# Patient Record
Sex: FEMALE | Race: OTHER | Employment: UNEMPLOYED | URBAN - METROPOLITAN AREA
[De-identification: names, ages, dates, MRNs, and addresses within clinical notes are randomized per-mention and may not be internally consistent; named-entity substitution may affect disease eponyms.]

---

## 2017-03-09 ENCOUNTER — OFFICE VISIT (OUTPATIENT)
Dept: OBGYN CLINIC | Age: 42
End: 2017-03-09

## 2017-03-09 ENCOUNTER — APPOINTMENT (OUTPATIENT)
Dept: MAMMOGRAPHY | Age: 42
End: 2017-03-09

## 2017-03-09 VITALS
WEIGHT: 127 LBS | SYSTOLIC BLOOD PRESSURE: 119 MMHG | HEIGHT: 62 IN | BODY MASS INDEX: 23.37 KG/M2 | DIASTOLIC BLOOD PRESSURE: 75 MMHG | HEART RATE: 86 BPM

## 2017-03-09 DIAGNOSIS — Z01.419 ENCOUNTER FOR GYNECOLOGICAL EXAMINATION: Primary | ICD-10-CM

## 2017-03-09 DIAGNOSIS — L68.0 HIRSUTISM: ICD-10-CM

## 2017-03-09 DIAGNOSIS — Z12.31 ENCOUNTER FOR SCREENING MAMMOGRAM FOR MALIGNANT NEOPLASM OF BREAST: ICD-10-CM

## 2017-03-09 DIAGNOSIS — R87.810 ASCUS WITH POSITIVE HIGH RISK HPV CERVICAL: ICD-10-CM

## 2017-03-09 DIAGNOSIS — R87.610 ASCUS WITH POSITIVE HIGH RISK HPV CERVICAL: ICD-10-CM

## 2017-03-09 RX ORDER — DESOGESTREL AND ETHINYL ESTRADIOL 21-5 (28)
1 KIT ORAL DAILY
Qty: 1 PACKAGE | Refills: 3 | Status: SHIPPED | OUTPATIENT
Start: 2017-03-09 | End: 2019-05-13

## 2017-03-09 NOTE — MR AVS SNAPSHOT
Visit Information Date & Time Provider Department Dept. Phone Encounter #  
 3/9/2017 10:40 AM Kellen Armendariz [phone] [id number] Upcoming Health Maintenance Date Due INFLUENZA AGE 9 TO ADULT 8/1/2016 PAP AKA CERVICAL CYTOLOGY 5/17/2019 Allergies as of 3/9/2017  Review Complete On: 3/9/2017 By: Gerald Simpson Severity Noted Reaction Type Reactions Sulfa (Sulfonamide Antibiotics)  06/30/2014    Hives Current Immunizations  Never Reviewed No immunizations on file. Not reviewed this visit Vitals BP Pulse Height(growth percentile) Weight(growth percentile) LMP BMI  
 119/75 86 5' 1.5\" (1.562 m) 127 lb (57.6 kg) 03/01/2017 (Exact Date) 23.61 kg/m2 OB Status Smoking Status Having regular periods Never Smoker BMI and BSA Data Body Mass Index Body Surface Area  
 23.61 kg/m 2 1.58 m 2 Your Updated Medication List  
  
   
This list is accurate as of: 3/9/17 10:56 AM.  Always use your most recent med list.  
  
  
  
  
 acyclovir 200 mg capsule Commonly known as:  ZOVIRAX  
  
 ergocalciferol 50,000 unit capsule Commonly known as:  ERGOCALCIFEROL Patient Instructions Well Visit, Ages 25 to 48: Care Instructions Your Care Instructions Physical exams can help you stay healthy. Your doctor has checked your overall health and may have suggested ways to take good care of yourself. He or she also may have recommended tests. At home, you can help prevent illness with healthy eating, regular exercise, and other steps. Follow-up care is a key part of your treatment and safety. Be sure to make and go to all appointments, and call your doctor if you are having problems. It's also a good idea to know your test results and keep a list of the medicines you take. How can you care for yourself at home? · Reach and stay at a healthy weight. This will lower your risk for many problems, such as obesity, diabetes, heart disease, and high blood pressure. · Get at least 30 minutes of physical activity on most days of the week. Walking is a good choice. You also may want to do other activities, such as running, swimming, cycling, or playing tennis or team sports. Discuss any changes in your exercise program with your doctor. · Do not smoke or allow others to smoke around you. If you need help quitting, talk to your doctor about stop-smoking programs and medicines. These can increase your chances of quitting for good. · Talk to your doctor about whether you have any risk factors for sexually transmitted infections (STIs). Having one sex partner (who does not have STIs and does not have sex with anyone else) is a good way to avoid these infections. · Use birth control if you do not want to have children at this time. Talk with your doctor about the choices available and what might be best for you. · Protect your skin from too much sun. When you're outdoors from 10 a.m. to 4 p.m., stay in the shade or cover up with clothing and a hat with a wide brim. Wear sunglasses that block UV rays. Even when it's cloudy, put broad-spectrum sunscreen (SPF 30 or higher) on any exposed skin. · See a dentist one or two times a year for checkups and to have your teeth cleaned. · Wear a seat belt in the car. · Drink alcohol in moderation, if at all. That means no more than 2 drinks a day for men and 1 drink a day for women. Follow your doctor's advice about when to have certain tests. These tests can spot problems early. For everyone · Cholesterol. Have the fat (cholesterol) in your blood tested after age 21. Your doctor will tell you how often to have this done based on your age, family history, or other things that can increase your risk for heart disease. · Blood pressure. Have your blood pressure checked during a routine doctor visit. Your doctor will tell you how often to check your blood pressure based on your age, your blood pressure results, and other factors. · Vision. Talk with your doctor about how often to have a glaucoma test. 
· Diabetes. Ask your doctor whether you should have tests for diabetes. · Colon cancer. Have a test for colon cancer at age 48. You may have one of several tests. If you are younger than 48, you may need a test earlier if you have any risk factors. Risk factors include whether you already had a precancerous polyp removed from your colon or whether your parent, brother, sister, or child has had colon cancer. For women · Breast exam and mammogram. Talk to your doctor about when you should have a clinical breast exam and a mammogram. Medical experts differ on whether and how often women under 50 should have these tests. Your doctor can help you decide what is right for you. · Pap test and pelvic exam. Begin Pap tests at age 24. A Pap test is the best way to find cervical cancer. The test often is part of a pelvic exam. Ask how often to have this test. 
· Tests for sexually transmitted infections (STIs). Ask whether you should have tests for STIs. You may be at risk if you have sex with more than one person, especially if your partners do not wear condoms. For men · Tests for sexually transmitted infections (STIs). Ask whether you should have tests for STIs. You may be at risk if you have sex with more than one person, especially if you do not wear a condom. · Testicular cancer exam. Ask your doctor whether you should check your testicles regularly. · Prostate exam. Talk to your doctor about whether you should have a blood test (called a PSA test) for prostate cancer.  Experts differ on whether and when men should have this test. Some experts suggest it if you are older than 39 and are -American or have a father or brother who got prostate cancer when he was younger than 72. When should you call for help? Watch closely for changes in your health, and be sure to contact your doctor if you have any problems or symptoms that concern you. Where can you learn more? Go to http://xiomy-liu.info/. Enter P072 in the search box to learn more about \"Well Visit, Ages 25 to 48: Care Instructions. \" Current as of: July 19, 2016 Content Version: 11.1 © 0067-6597 [employer]orporated. Care instructions adapted under license by [employer] (which disclaims liability or warranty for this information). If you have questions about a medical condition or this instruction, always ask your healthcare professional. [address] any warranty or liability for your use of this information. Introducing [facility]! [employer] introduces PowerVision patient portal. Now you can access parts of your medical record, email your doctor's office, and request medication refills online. 1. In your internet browser, go to [email or web address] 2. Click on the First Time User? Click Here link in the Sign In box. You will see the New Member Sign Up page. 3. Enter your PowerVision Access Code exactly as it appears below. You will not need to use this code after youve completed the sign-up process. If you do not sign up before the expiration date, you must request a new code. · PowerVision Access Code: [email or web address] Expires: 6/7/2017 10:56 AM 
 
4. Enter the last four digits of your Social Security Number (xxxx) and Date of Birth (mm/dd/yyyy) as indicated and click Submit. You will be taken to the next sign-up page. 5. Create a PowerVision ID. This will be your PowerVision login ID and cannot be changed, so think of one that is secure and easy to remember. 6. Create a Agora Mobile password. You can change your password at any time. 7. Enter your Password Reset Question and Answer. This can be used at a later time if you forget your password. 8. Enter your e-mail address. You will receive e-mail notification when new information is available in [address] 9. Click Sign Up. You can now view and download portions of your medical record. 10. Click the Download Summary menu link to download a portable copy of your medical information. If you have questions, please visit the Frequently Asked Questions section of the Agora Mobile website. Remember, Agora Mobile is NOT to be used for urgent needs. For medical emergencies, dial 911. Now available from your iPhone and Android! Please provide this summary of care documentation to your next provider. If you have any questions after today's visit, please call [phone].

## 2017-03-09 NOTE — PATIENT INSTRUCTIONS

## 2017-03-09 NOTE — PROGRESS NOTES
Annual exam ages 40-58    [address] Rich is a ,  37 y.o. female OTHER Patient's last menstrual period was 2017 (exact date). , who presents for her annual checkup. Since her last annual GYN exam about two years ago on 9/14/15,  she has the following changes in her health history: none. ADDITIONAL CONCERNS:  She is having cycles every 21 days for the last 3 months. Previously were about every 4wks. Premenstrual HA and mood changes. HA will subside the first couple of days of her cycle, but then may come back toward the end of her cycle. Will take ibpfn - helps a little, but doesn't get rid of. Hirsutism. Labs drawn at [address] 2015, wnl. Hair growth on chin/neck and nipples is a little worse. With regard to the Gardasil vaccine, she is older than the age for which it is FDA approved. Menstrual status:    Her periods are heavier in flow. She is using one to two pads or tampons per day, lasting for 4-5 days  without spotting. Does pass some clots. She has some dysmenorrhea. She denies premenstrual symptoms. Contraception:    The current method of family planning is none and abstinence. Sexual history:     reports that she does not currently engage in sexual activity but has had male partners.; She reports using the following methods of birth control/protection: None and Abstinence. Pap and Mammogram History:    H/o colpo in college, not sure of results. Thinks may have been tx'd with cryo.     Pap/HPV (2014) N/N  Pap (9/14/15) ASCUS ; HPV Positive [done at pt's request]  -> rec colpo, pt declined, requested rpt pap  Pap/HPV (16) N/N    The patient had her mammogram today in our office. Osteoporosis History:    Family history does not include a first or second degree relative with osteopenia or osteoporosis. Past Medical History:   Diagnosis Date    Abnormal Pap smear of cervix 2015    ASCUS ; HPV Positive -> rec colpo, pt declined.   H/o Colpo in college. Not sure of details, thinks she was tx'd with cryo    Screening for malignant neoplasm of the cervix 14    Negative ; HPV Negative     Past Surgical History:   Procedure Laterality Date    HX  SECTION  2013    HX COLPOSCOPY  ~    not sure of details. ?cryo done? OB History    Para Term  AB SAB TAB Ectopic Multiple Living   1 1 1       1      # Outcome Date GA Lbr Gagandeep/2nd Weight Sex Delivery Anes PTL Lv   1 Term 02/10/13 40w0d 23:00 5 lb 8 oz (2.495 kg) M  CL Spinal N Y      Birth Comments: Cord wrapped around baby's nexk - emerg. c/s          Current Outpatient Prescriptions   Medication Sig Dispense Refill    acyclovir (ZOVIRAX) 200 mg capsule   4    ergocalciferol (ERGOCALCIFEROL) 50,000 unit capsule   10     Allergies: Sulfa (sulfonamide antibiotics)   Social History     Social History    Marital status: SINGLE     Spouse name: N/A    Number of children: N/A    Years of education: N/A     Occupational History    Not on file. Social History Main Topics    Smoking status: Never Smoker    Smokeless tobacco: Never Used      Comment: Does not use vapor and e-cigs    Alcohol use No    Drug use: No    Sexual activity: Not Currently     Partners: Male     Birth control/ protection: None, Abstinence     Other Topics Concern    Not on file     Social History Narrative     Tobacco History:  reports that she has never smoked. She has never used smokeless tobacco.  Alcohol Abuse:  reports that she does not drink alcohol. Drug Abuse:  reports that she does not use illicit drugs.     Patient Active Problem List   Diagnosis Code    Abnormal Pap smear of cervix R87.619     Family History   Problem Relation Age of Onset    Breast Cancer Neg Hx        Review of Systems - History obtained from the patient  Constitutional: negative for weight loss, fever, night sweats  HEENT: negative for hearing loss, earache, congestion, snoring, sorethroat  CV: negative for chest pain, palpitations, edema  Resp: negative for cough, shortness of breath, wheezing  GI: negative for change in bowel habits, abdominal pain, black or bloody stools  : negative for frequency, dysuria, hematuria, vaginal discharge  MSK: negative for back pain, joint pain, muscle pain  Breast: negative for breast lumps, nipple discharge, galactorrhea  Skin :negative for itching, rash, hives  Neuro: negative for dizziness, confusion, weakness; +HA  Psych: negative for anxiety, depression, change in mood  Heme/lymph: negative for bleeding, bruising, pallor    Physical Exam    Visit Vitals    /75    Pulse 86    Ht 5' 1.5\" (1.562 m)    Wt 127 lb (57.6 kg)    LMP 03/01/2017 (Exact Date)    BMI 23.61 kg/m2       Constitutional  · Appearance: well-nourished, well developed, alert, in no acute distress    HENT  · Head and Face: appears normal    Neck  · Inspection/Palpation: normal appearance, no masses or tenderness  · Lymph Nodes: no lymphadenopathy present  · Thyroid: gland size normal, nontender, no nodules or masses present on palpation    Chest  · Respiratory Effort: breathing unlabored  · Auscultation: normal breath sounds    Cardiovascular  · Heart:  · Auscultation: regular rate and rhythm without murmur    Breasts  · Inspection of Breasts: breasts symmetrical, no skin changes, no discharge present, nipple appearance normal, no skin retraction present  · Palpation of Breasts and Axillae: no masses present on palpation, no breast tenderness  · Axillary Lymph Nodes: no lymphadenopathy present    Gastrointestinal  · Abdominal Examination: abdomen non-tender to palpation, normal bowel sounds, no masses present  · Liver and spleen: no hepatomegaly present, spleen not palpable  · Hernias: no hernias identified    Genitourinary  · External Genitalia: normal appearance for age, no discharge present, no tenderness present, no inflammatory lesions present, no masses present, no atrophy present  · Vagina: normal vaginal vault without central or paravaginal defects, no discharge present, no inflammatory lesions present, no masses present  · Bladder: non-tender to palpation  · Urethra: appears normal  · Cervix: normal   · Uterus: normal size, shape and consistency  · Adnexa: no adnexal tenderness present, no adnexal masses present  · Perineum: perineum within normal limits, no evidence of trauma, no rashes or skin lesions present  · Anus: anus within normal limits, no hemorrhoids present  · Inguinal Lymph Nodes: no lymphadenopathy present    Skin  · General Inspection: no rash, no lesions identified    Neurologic/Psychiatric  · Mental Status:  · Orientation: grossly oriented to person, place and time  · Mood and Affect: mood normal, affect appropriate    Results for orders placed or performed in visit on 03/09/17   MARVA MAMMO BI SCREENING INCL CAD    Narrative    STUDY: Bilateral digital screening mammogram    INDICATION:  Screening. COMPARISON:  None, baseline    BREAST COMPOSITION:  The breasts are heterogeneously dense, which may obscure  small masses. FINDINGS: Bilateral digital screening mammography was performed and is  interpreted in conjunction with a computer assisted detection (CAD) system. No  suspicious masses or calcifications are identified. Impression    IMPRESSION:  BI-RADS 2: Benign. No mammographic evidence of malignancy. RECOMMENDATIONS:  Next screening mammogram is recommended in one year. The patient will be notified of these results. Assessment & Plan:  · Routine gynecologic examination. · Reviewed previous paps, guidelines. Will do pap with reflex HPV today. If nl, then plan pap/HPV with AE in 1yr. · Hirsutism. Will draw, TT, FT, SHBG, DHEA-S, 17-OHP. · Menstrual HA. Will try Mircette. Risk and benefits reviewed, including thromboembolic events. 1st Sunday start, backup method until second pack. Handout given.   RTO 3 months for BP check, side effect eval. Should also help with hirsutism. · Cycles closer together, heavier, crampier. Should improve with OCPs as above. Menstrual calendar given. · Counseled re: diet, exercise, healthy lifestyle  · Return for yearly wellness visits  · Rec annual mammogram.  Done today in our office, wnl. · Patient verbalized understanding    Orders Placed This Encounter    DHEA SULFATE    SEX HORMONE BINDING GLOBULIN    TESTOSTERONE, FREE & TOTAL    17-OH PROGESTERONE LCMS    desog-e.estradiol/e.estradiol (MIRCETTE, 28,) 0.15-0.02 mgx21 /0.01 mg x 5 tab     Sig: Take 1 Tab by mouth daily. Dispense:  1 Package     Refill:  3                No orders of the defined types were placed in this encounter.

## 2017-03-12 LAB
17OHP SERPL-MCNC: 25 NG/DL
CYTOLOGIST CVX/VAG CYTO: NORMAL
CYTOLOGY CVX/VAG DOC THIN PREP: NORMAL
DHEA-S SERPL-MCNC: 177.7 UG/DL (ref 57.3–279.2)
DX ICD CODE: NORMAL
LABCORP, 190119: NORMAL
Lab: NORMAL
OTHER STN SPEC: NORMAL
PATH REPORT.FINAL DX SPEC: NORMAL
SHBG SERPL-SCNC: 58.6 NMOL/L (ref 24.6–122)
STAT OF ADQ CVX/VAG CYTO-IMP: NORMAL
TESTOST FREE SERPL-MCNC: 1.4 PG/ML (ref 0–4.2)
TESTOST SERPL-MCNC: 6 NG/DL (ref 8–48)

## 2017-03-13 ENCOUNTER — TELEPHONE (OUTPATIENT)
Dept: OBGYN CLINIC | Age: 42
End: 2017-03-13

## 2017-03-13 NOTE — TELEPHONE ENCOUNTER
Patient notified and verbalized understanding. Patient is wondering if low testosterone may cause headaches? Is there any medications she needs to take? What other side affects may low Testosterone  have? Is this a big concern?

## 2017-03-13 NOTE — PROGRESS NOTES
Patient notified and verbalized understanding. Patient is wondering if low testosterone may cause headaches? Is there any medications she needs to take? What other side affects may low Testosterone  have? Is this a big concern?      See Tel enc

## 2017-03-13 NOTE — TELEPHONE ENCOUNTER
----- Message from Jamaica Bello MD sent at 3/13/2017  7:54 AM EDT -----  Blood work is normal.  Testosterone level is actually slightly low, unchanged from when it has been checked in the past.

## 2017-03-14 NOTE — TELEPHONE ENCOUNTER
Low testosterone can be related to decreased libido, but should not cause HA. No other concerns. Her levels have been stable in this range, just barely low (lower limit of normal is 8).   Her circulating level of active testosterone is normal.

## 2017-03-30 ENCOUNTER — TELEPHONE (OUTPATIENT)
Dept: OBGYN CLINIC | Age: 42
End: 2017-03-30

## 2017-03-30 ENCOUNTER — OFFICE VISIT (OUTPATIENT)
Dept: OBGYN CLINIC | Age: 42
End: 2017-03-30

## 2017-03-30 VITALS
HEIGHT: 62 IN | DIASTOLIC BLOOD PRESSURE: 59 MMHG | WEIGHT: 124 LBS | SYSTOLIC BLOOD PRESSURE: 90 MMHG | HEART RATE: 87 BPM | BODY MASS INDEX: 22.82 KG/M2

## 2017-03-30 DIAGNOSIS — R10.2 VULVAR PAIN: ICD-10-CM

## 2017-03-30 DIAGNOSIS — R10.2 VAGINAL PAIN: ICD-10-CM

## 2017-03-30 DIAGNOSIS — Z11.3 SCREENING FOR VENEREAL DISEASE: ICD-10-CM

## 2017-03-30 DIAGNOSIS — N94.89 VULVAR BURNING: Primary | ICD-10-CM

## 2017-03-30 LAB — WET MOUNT POCT, WMPOCT: NEGATIVE

## 2017-03-30 RX ORDER — FLUCONAZOLE 150 MG/1
150 TABLET ORAL DAILY
Qty: 1 TAB | Refills: 0 | Status: SHIPPED | OUTPATIENT
Start: 2017-03-30 | End: 2017-03-31

## 2017-03-30 RX ORDER — CLOTRIMAZOLE AND BETAMETHASONE DIPROPIONATE 10; .64 MG/G; MG/G
CREAM TOPICAL
Qty: 45 G | Refills: 0 | Status: SHIPPED | OUTPATIENT
Start: 2017-03-30 | End: 2019-06-10

## 2017-03-30 NOTE — MR AVS SNAPSHOT
Visit Information Date & Time Provider Department Dept. Phone Encounter #  
 3/30/2017  1:00  S Anthony Brown, [address] [phone] [id number] Your Appointments 6/27/2017 10:00 AM  
FOLLOW UP 10 with [address], MD  
[place] ([facility]) Appt Note: 3 month gyn f/u DM - OCP start [address] [place] [address] Road [address] [place] [address]  
[phone]  
  
   
 [address] [place] [address]  
  
    
 3/12/2018 10:20 AM  
MAMMOGRAPHY with MAMMOGRAM, [place] ([facility]) Appt Note: mammo and ae DM  
 [address] [place], [place] [address]d Road [address] [place] [address]  
[phone]  
  
   
 [address] [place] [address] Road [address]  
  
    
 3/12/2018 10:40 AM  
ESTABLISHED PATIENT with [address], MD  
[place] ([facility]) Appt Note: mammo and ae DM  
 [address] [place] [address] Road [address] [facility] 99 [id number]  
[facility] [address] Upcoming Health Maintenance Date Due INFLUENZA AGE 9 TO ADULT 8/1/2016 BREAST CANCER SCRN MAMMOGRAM 3/9/2018 PAP AKA CERVICAL CYTOLOGY 3/9/2020 Allergies as of 3/30/2017  Review Complete On: 3/30/2017 By: Reyna Willams Severity Noted Reaction Type Reactions Sulfa (Sulfonamide Antibiotics)  06/30/2014    Hives Current Immunizations  Never Reviewed No immunizations on file. Not reviewed this visit Vitals BP Pulse Height(growth percentile) Weight(growth percentile) LMP BMI  
 90/59 87 5' 1.5\" (1.562 m) 124 lb (56.2 kg) 03/30/2017 23.05 kg/m2 OB Status Smoking Status Having regular periods Never Smoker BMI and BSA Data Body Mass Index Body Surface Area 23.05 kg/m 2 1.56 m 2 Preferred Pharmacy Pharmacy Name Phone [id number], [address] AT [facility] [phone] Your Updated Medication List  
  
   
This list is accurate as of: 3/30/17  1:08 PM.  Always use your most recent med list.  
  
  
  
  
 acyclovir 200 mg capsule Commonly known as:  ZOVIRAX  
  
 desog-e.estradiol/e.estradiol 0.15-0.02 mgx21 /0.01 mg x 5 Tab Commonly known as:  MIRCETTE (28) Take 1 Tab by mouth daily. ergocalciferol 50,000 unit capsule Commonly known as:  ERGOCALCIFEROL Patient Instructions Per Dr. Carolyn Mcguire: 
Lulú Smith MD  
  
  3:03 PM  
Note Low testosterone can be related to decreased libido, but should not cause HA. No other concerns. Her levels have been stable in this range, just barely low (lower limit of normal is 8). Her circulating level of active testosterone is normal.  
  
 
 
  
Vulvar Pain: Care Instructions Your Care Instructions The vulva is the area around the opening of the vagina. The cause of vulvar pain (vulvodynia) is not always clear, but it may include inflamed nerves, allergies, skin diseases, diabetes, or infection. You may have pain just in the vulva, or it may reach to the rectal area or legs. Vulvar pain can flare up with activities such as sitting on a bicycle, having sex, or inserting a tampon. Follow-up care is a key part of your treatment and safety. Be sure to make and go to all appointments, and call your doctor if you are having problems. Its also a good idea to know your test results and keep a list of the medicines you take. How can you care for yourself at home? · Take your medicines exactly as prescribed. Call your doctor if you think you are having a problem with your medicine. · Relieve itching with a cold water compress or cool baths. Do not scratch the area. · Wear loose-fitting, cotton clothes. Avoid nylon and other fabric that holds moisture close to the skin. This may allow an infection to start. · Do not douche, unless your doctor tells you to. · Limit exercise that can irritate the vulva, such as bike riding or horseback riding. · Avoid hot baths, and do not use soaps or bath products to wash your vulvar area. Rinse with water only, and gently pat the area dry. When should you call for help? Call your doctor now or seek immediate medical care if: 
· You have signs of infection, such as: 
¨ Increased pain, swelling, warmth, or redness. ¨ Red streaks leading from the vulvar area. ¨ Pus draining from the area. ¨ A fever. Watch closely for changes in your health, and be sure to contact your doctor if: 
· You have unusual vaginal discharge. · You do not get better as expected. Where can you learn more? Go to http://xiomy-liu.info/. Enter 0490 39 07 81 in the search box to learn more about \"Vulvar Pain: Care Instructions. \" Current as of: October 13, 2016 Content Version: 11.2 © 2462-4993 "[employer]. Care instructions adapted under license by [employer] (which disclaims liability or warranty for this information). If you have questions about a medical condition or this instruction, always ask your healthcare professional. [place] [address] any warranty or liability for your use of this information. Introducing [facility]! Apurva Orozco introduces Cloudian patient portal. Now you can access parts of your medical record, email your doctor's office, and request medication refills online. 1. In your internet browser, go to [email or web address] 2. Click on the First Time User? Click Here link in the Sign In box. You will see the New Member Sign Up page. 3. Enter your Cloudian Access Code exactly as it appears below. You will not need to use this code after youve completed the sign-up process. If you do not sign up before the expiration date, you must request a new code. · Cloudian Access Code: [email or web address] Expires: 6/7/2017 11:56 AM 
 
4. Enter the last four digits of your Social Security Number (xxxx) and Date of Birth (mm/dd/yyyy) as indicated and click Submit. You will be taken to the next sign-up page. 5. Create a RevTrax ID. This will be your RevTrax login ID and cannot be changed, so think of one that is secure and easy to remember. 6. Create a RevTrax password. You can change your password at any time. 7. Enter your Password Reset Question and Answer. This can be used at a later time if you forget your password. 8. Enter your e-mail address. You will receive e-mail notification when new information is available in [address] 9. Click Sign Up. You can now view and download portions of your medical record. 10. Click the Download Summary menu link to download a portable copy of your medical information. If you have questions, please visit the Frequently Asked Questions section of the RevTrax website. Remember, RevTrax is NOT to be used for urgent needs. For medical emergencies, dial 911. Now available from your iPhone and Android! Please provide this summary of care documentation to your next provider. If you have any questions after today's visit, please call [phone].

## 2017-03-30 NOTE — TELEPHONE ENCOUNTER
Call received at 8:34am      37year old patient last seen in the office on 3/9/17. Patient calling to complain of pain in vaginal area, burning upon urination that can be 9 on scale of 1-10. Patient denies discharge. Patient reports the symptoms started the beginning of the week. Patient placed on the schedule for 1 pm today as per Dr. Elvi Glass. Patient advised that she was being worked in. Patient verbalized understanding.

## 2017-03-30 NOTE — PATIENT INSTRUCTIONS
Per Dr. Mckeon Day:  Erica Dakins, MD        3:03 PM   Note      Low testosterone can be related to decreased libido, but should not cause HA. No other concerns. Her levels have been stable in this range, just barely low (lower limit of normal is 8). Her circulating level of active testosterone is normal.             Vulvar Pain: Care Instructions  Your Care Instructions  The vulva is the area around the opening of the vagina. The cause of vulvar pain (vulvodynia) is not always clear, but it may include inflamed nerves, allergies, skin diseases, diabetes, or infection. You may have pain just in the vulva, or it may reach to the rectal area or legs. Vulvar pain can flare up with activities such as sitting on a bicycle, having sex, or inserting a tampon. Follow-up care is a key part of your treatment and safety. Be sure to make and go to all appointments, and call your doctor if you are having problems. Its also a good idea to know your test results and keep a list of the medicines you take. How can you care for yourself at home? · Take your medicines exactly as prescribed. Call your doctor if you think you are having a problem with your medicine. · Relieve itching with a cold water compress or cool baths. Do not scratch the area. · Wear loose-fitting, cotton clothes. Avoid nylon and other fabric that holds moisture close to the skin. This may allow an infection to start. · Do not douche, unless your doctor tells you to. · Limit exercise that can irritate the vulva, such as bike riding or horseback riding. · Avoid hot baths, and do not use soaps or bath products to wash your vulvar area. Rinse with water only, and gently pat the area dry. When should you call for help? Call your doctor now or seek immediate medical care if:  · You have signs of infection, such as:  ¨ Increased pain, swelling, warmth, or redness. ¨ Red streaks leading from the vulvar area. ¨ Pus draining from the area. ¨ A fever.   Watch closely for changes in your health, and be sure to contact your doctor if:  · You have unusual vaginal discharge. · You do not get better as expected. Where can you learn more? Go to http://xiomy-liu.info/. Enter 0490 39 07 81 in the search box to learn more about \"Vulvar Pain: Care Instructions. \"  Current as of: October 13, 2016  Content Version: 11.2  © 4893-8316 [employer]. Care instructions adapted under license by [employer] (which disclaims liability or warranty for this information). If you have questions about a medical condition or this instruction, always ask your healthcare professional. [address] any warranty or liability for your use of this information.

## 2017-03-30 NOTE — PROGRESS NOTES
Vaginitis evaluation    Chief Complaint   Vaginitis and Vulvar Abnormaility      HPI  37 y.o. female complains of vulvar burning for 6-7 days. Patient's last menstrual period was 2017. She has additional symptoms at this time - spotted for 2 days about 10 days ago. Pain when urine hits skin, does not believe it's urine related. The patient denies aggravating factors. She is not concerned about possible STI exposure at this time. Not currently sexually active  She denies exposure to new chemicals ot hygenic agents. Does use Always brand pads, but has used for years. Was wearing exercise clothing, at the gym -- wondering if could be related. Previous treatment included: none    Past Medical History:   Diagnosis Date    Abnormal Pap smear of cervix 2015    ASCUS ; HPV Positive -> rec colpo, pt declined. H/o Colpo in college. Not sure of details, thinks she was tx'd with cryo    Hx of mammogram 2017    Negative     Routine Papanicolaou smear 2017    Negative (no hpv)     Screening for malignant neoplasm of the cervix 14    Negative ; HPV Negative     Past Surgical History:   Procedure Laterality Date    HX  SECTION  2013    HX COLPOSCOPY  ~    not sure of details. ?cryo done? Social History     Occupational History    Not on file. Social History Main Topics    Smoking status: Never Smoker    Smokeless tobacco: Never Used      Comment: Does not use vapor and e-cigs    Alcohol use No    Drug use: No    Sexual activity: Not Currently     Partners: Male     Birth control/ protection: None, Abstinence     Family History   Problem Relation Age of Onset    Breast Cancer Neg Hx         Allergies   Allergen Reactions    Sulfa (Sulfonamide Antibiotics) Hives     Prior to Admission medications    Medication Sig Start Date End Date Taking?  Authorizing Provider   clotrimazole-betamethasone (LOTRISONE) topical cream Apply to affected area BID x1-2wks 3/30/17 Yes Mimi Rice MD   fluconazole (DIFLUCAN) 150 mg tablet Take 1 Tab by mouth daily for 1 day. FDA advises cautious prescribing of oral fluconazole in pregnancy. 3/30/17 3/31/17 Yes Mimi Rice MD   ergocalciferol (ERGOCALCIFEROL) 50,000 unit capsule  8/8/15  Yes Historical Provider   desog-e.estradiol/e.estradiol (MIRCETTE, 28,) 0.15-0.02 mgx21 /0.01 mg x 5 tab Take 1 Tab by mouth daily.  3/9/17   Mimi Rice MD   acyclovir (ZOVIRAX) 200 mg capsule  7/9/15   Historical Provider                          Objective:    Visit Vitals    BP 90/59    Pulse 87    Ht 5' 1.5\" (1.562 m)    Wt 124 lb (56.2 kg)    LMP 03/30/2017    BMI 23.05 kg/m2       Physical Exam:   PHYSICAL EXAMINATION    Constitutional  · Appearance: well-nourished, well developed, alert, in no acute distress    HENT  · Head and Face: appears normal    Genitourinary  · External Genitalia:             · Vagina:  no discharge present, small amount of menstrual blood, otherwise normal vaginal vault without central or paravaginal defects, no inflammatory lesions present, no masses present  · Bladder: non-tender to palpation  · Urethra: appears normal  · Cervix: normal   · Uterus: normal size, shape and consistency  · Adnexa: no adnexal tenderness present, no adnexal masses present  · Perineum: perineum within normal limits, no evidence of trauma, no rashes or skin lesions present  · Anus: anus within normal limits, no hemorrhoids present  · Inguinal Lymph Nodes: no lymphadenopathy present    Skin  · General Inspection: no rash, no lesions identified    Neurologic/Psychiatric  · Mental Status:  · Orientation: grossly oriented to person, place and time  · Mood and Affect: mood normal, affect appropriate          Results for orders placed or performed in visit on 03/30/17   AMB POC SMEAR, STAIN & INTERPRET, WET MOUNT   Result Value Ref Range    Wet mount (POC) negative     Narrative    WP/BELIA    Hypae: negative  Buds: negative  Whiff: negative    Wet Prep:  Trich: negative  Clue cells: negative  Hyphae: negative  Buds: negative  WBC's: normal         Assessment:   Vulvar burning    Plan:   - HSV swab of vulva  - NuSwab for BV/yeast  - urine cx  - will treat empirically for yeast with Lotrisone externally, Diflucan x1. Advised to avoid Always brand products; wear loose clothing. ROV prn if symptoms persist or worsen. Orders Placed This Encounter    CULTURE, URINE    HERPES SIMPLEX VIRUS (HSV) AVELINO    NUSWAB VAGINOSIS + CANDIDA    AMB POC SMEAR, STAIN & INTERPRET, WET MOUNT    clotrimazole-betamethasone (LOTRISONE) topical cream     Sig: Apply to affected area BID x1-2wks     Dispense:  45 g     Refill:  0    fluconazole (DIFLUCAN) 150 mg tablet     Sig: Take 1 Tab by mouth daily for 1 day. FDA advises cautious prescribing of oral fluconazole in pregnancy.      Dispense:  1 Tab     Refill:  0

## 2017-04-01 LAB — BACTERIA UR CULT: NO GROWTH

## 2017-04-03 LAB
HSV1 DNA SPEC QL NAA+PROBE: NEGATIVE
HSV2 DNA SPEC QL NAA+PROBE: NEGATIVE

## 2017-04-04 LAB
A VAGINAE DNA VAG QL NAA+PROBE: ABNORMAL SCORE
BVAB2 DNA VAG QL NAA+PROBE: ABNORMAL SCORE
C ALBICANS DNA VAG QL NAA+PROBE: POSITIVE
C GLABRATA DNA VAG QL NAA+PROBE: NEGATIVE
MEGA1 DNA VAG QL NAA+PROBE: ABNORMAL SCORE

## 2017-04-11 ENCOUNTER — TELEPHONE (OUTPATIENT)
Dept: OBGYN CLINIC | Age: 42
End: 2017-04-11

## 2017-04-11 NOTE — TELEPHONE ENCOUNTER
----- Message from Mary Tompkins sent at 1/9/2017  4:54 PM EST -----  Regarding: RE: 9/2016 - Pap/HPV  1/9/17 - Spoke with patient and danny'd AE/Pap for Thrs. (Only day that works for her) 3/9/17 @ 10:20am Mammo and then DM @ 10:40am.   ----- Message -----     From: Mary Tompkins     Sent: 6/8/2016   9:57 AM       To: Puma Araiza  Subject: 9/2016 - Pap/HPV                                 Notes Recorded by Hannah Rodríguez RN on 5/31/2016 at 1:55 PM  Patient notified of MD reviewed lab results and verbalized understanding. Patient advised of AE due for Sept 2016. Notes Recorded by Puma Araiza on 5/31/2016 at 11:31 AM  2nd attempt to reach pt - LMTCB      Notes Recorded by Puma Araiza on 5/24/2016 at 10:58 AM  - LMTCB    Notes Recorded by Joy Schofield MD on 5/23/2016 at 11:13 AM  Nuswab negative/normal.  Notify pt. Pap nl with negative HPV -> can rpt 1yr. Marty Antoine. AE due in Sept.    ----- Message -----     From: Puma Araiza     Sent: 11/30/2015   4:04 PM       To: Puma Araiza  Subject: 3/2016 - 6 month FU                              Puma Araiza  11/30/2015  4:04 PM  Signed  Spoke with patient in regards to Colpo appt. and she has decided to wait 6 months from last ov, which will make it in March 2016. Also reviewed MD's notes below with patient and she still rather wait. TICKLED for March 2016. Joy Schofield MD 10/16/2015 4:36 PM Signed  Reviewed pap with pt: ASCUS +HPV. Has remote h/o abnl pap in 1990s, tx'd with cryo. Had bx ~10yrs later, was told by subsequent examiner that bx was very large. Had neg pap/HPV in 2014. Most recent pap was done at her request. She has not been sexually active in 2-3yrs. Has been under a lot of stress this year. She is questioning the urgency of doing colposcopy with possible bx given that by ASCCP guidelines, would not have even had pap done.   Also concerned that repeated bx may damage cervix, negatively impact future pregnancies. Reviewed cervical dysplasia and rationale for current screening guidelines. Discussed that now we do have abnl pap that needs to be addressed. Reassured pt that biopsies are usually small, heal well, and do not have any long term sequelae related to fertility/pregnancy. She is asking about just doing rpt pap/HPV in 6 months. Discussed do not have any specific guidelines/recommendations for this. May also be an issue for insurance coverage. She will consider. Will call back if she decides to schedule. Explained our office with contact her if we have not heard from her in a few weeks. Grace Turk MD 10/16/2015 10:59 AM Signed  Still trying to reach pt. Got voice mail. LM  ----- Message -----     From: Lucia Betts     Sent: 10/19/2015   4:25 PM       To: Lucia Betts  Subject: 4wk Colpo reminder                               Make sure she is scheduled for Colposcopy.

## 2017-04-11 NOTE — TELEPHONE ENCOUNTER
----- Message from Jeimy Ruiz MD sent at 4/4/2017  9:11 AM EDT -----  Please let her know that her swab does show yeast.  She should clear with the prescriptions given at her appointment. Let me know if not resolving.

## 2018-05-11 ENCOUNTER — OFFICE VISIT (OUTPATIENT)
Dept: OBGYN CLINIC | Age: 43
End: 2018-05-11

## 2018-05-11 VITALS
WEIGHT: 124.2 LBS | DIASTOLIC BLOOD PRESSURE: 81 MMHG | SYSTOLIC BLOOD PRESSURE: 113 MMHG | HEIGHT: 62 IN | BODY MASS INDEX: 22.86 KG/M2

## 2018-05-11 DIAGNOSIS — R87.810 CERVICAL HIGH RISK HPV (HUMAN PAPILLOMAVIRUS) TEST POSITIVE: ICD-10-CM

## 2018-05-11 DIAGNOSIS — L68.0 HIRSUTISM: ICD-10-CM

## 2018-05-11 DIAGNOSIS — Z11.51 SPECIAL SCREENING EXAMINATION FOR HUMAN PAPILLOMAVIRUS (HPV): ICD-10-CM

## 2018-05-11 DIAGNOSIS — R51.9 NONINTRACTABLE HEADACHE, UNSPECIFIED CHRONICITY PATTERN, UNSPECIFIED HEADACHE TYPE: ICD-10-CM

## 2018-05-11 DIAGNOSIS — Z86.19 HISTORY OF HPV INFECTION: ICD-10-CM

## 2018-05-11 DIAGNOSIS — Z01.419 ENCOUNTER FOR GYNECOLOGICAL EXAMINATION WITHOUT ABNORMAL FINDING: Primary | ICD-10-CM

## 2018-05-11 NOTE — MR AVS SNAPSHOT
[address] [facility] 
[phone] Patient: Devon Olivas MRN: [id number] :1975 Visit Information Date & Time Provider Department Dept. Phone Encounter #  
 2018 10:40 AM Juarez Clemons MD [facility] [phone] [id number] Upcoming Health Maintenance Date Due  
 BREAST CANCER SCRN MAMMOGRAM 3/9/2018 Influenza Age 5 to Adult 2018 PAP AKA CERVICAL CYTOLOGY 3/9/2020 Allergies as of 2018  Review Complete On: 2018 By: Alycia Gonzales LPN Severity Noted Reaction Type Reactions Sulfa (Sulfonamide Antibiotics)  2014    Hives Current Immunizations  Never Reviewed No immunizations on file. Not reviewed this visit You Were Diagnosed With   
  
 Codes Comments Encounter for gynecological examination without abnormal finding    -  Primary ICD-10-CM: M27.551 ICD-9-CM: V72.31 Special screening examination for human papillomavirus (HPV)     ICD-10-CM: Z11.51 
ICD-9-CM: V73.81 History of HPV infection     ICD-10-CM: Z86.19 ICD-9-CM: V12.09 Vitals BP Height(growth percentile) Weight(growth percentile) LMP BMI OB Status 113/81 5' 1.5\" (1.562 m) 124 lb 3.2 oz (56.3 kg) 2018 (Exact Date) 23.09 kg/m2 Having regular periods Smoking Status Never Smoker BMI and BSA Data Body Mass Index Body Surface Area 23.09 kg/m 2 1.56 m 2 Preferred Pharmacy Pharmacy Name Phone [facility] [address] [facility] [phone] Your Updated Medication List  
  
   
This list is accurate as of 18 11:05 AM.  Always use your most recent med list.  
  
  
  
  
 acyclovir 200 mg capsule Commonly known as:  ZOVIRAX  
  
 clotrimazole-betamethasone topical cream  
Commonly known as:  Rhoda Musa Apply to affected area BID x1-2wks desog-e.estradiol/e.estradiol 0.15-0.02 mgx21 /0.01 mg x 5 Tab Commonly known as:  MIRCETTE (28) Take 1 Tab by mouth daily. ergocalciferol 50,000 unit capsule Commonly known as:  ERGOCALCIFEROL Patient Instructions Breast Self-Exam: Care Instructions Your Care Instructions A breast self-exam is when you check your breasts for lumps or changes. This regular exam helps you learn how your breasts normally look and feel. Most breast problems or changes are not because of cancer. Breast self-exam is not a substitute for a mammogram. Having regular breast exams by your doctor and regular mammograms improve your chances of finding any problems with your breasts. Some women set a time each month to do a step-by-step breast self-exam. Other women like a less formal system. They might look at their breasts as they brush their teeth, or feel their breasts once in a while in the shower. If you notice a change in your breast, tell your doctor. Follow-up care is a key part of your treatment and safety. Be sure to make and go to all appointments, and call your doctor if you are having problems. It's also a good idea to know your test results and keep a list of the medicines you take. How do you do a breast self-exam? 
· The best time to examine your breasts is usually one week after your menstrual period begins. Your breasts should not be tender then. If you do not have periods, you might do your exam on a day of the month that is easy to remember. · To examine your breasts: ¨ Remove all your clothes above the waist and lie down. When you are lying down, your breast tissue spreads evenly over your chest wall, which makes it easier to feel all your breast tissue. ¨ Use the pads-not the fingertips-of the 3 middle fingers of your left hand to check your right breast. Move your fingers slowly in small coin-sized circles that overlap. ¨ Use three levels of pressure to feel of all your breast tissue. Use light pressure to feel the tissue close to the skin surface. Use medium pressure to feel a little deeper. Use firm pressure to feel your tissue close to your breastbone and ribs. Use each pressure level to feel your breast tissue before moving on to the next spot. ¨ Check your entire breast, moving up and down as if following a strip from the collarbone to the bra line, and from the armpit to the ribs. Repeat until you have covered the entire breast. 
¨ Repeat this procedure for your left breast, using the pads of the 3 middle fingers of your right hand. · To examine your breasts while in the shower: 
¨ Place one arm over your head and lightly soap your breast on that side. ¨ Using the pads of your fingers, gently move your hand over your breast (in the strip pattern described above), feeling carefully for any lumps or changes. ¨ Repeat for the other breast. 
· Have your doctor inspect anything you notice to see if you need further testing. Where can you learn more? Go to http://xiomy-liu.info/. Enter P148 in the search box to learn more about \"Breast Self-Exam: Care Instructions. \" Current as of: May 12, 2017 Content Version: 11.4 © 6798-1642 Healthwise, Incorporated. Care instructions adapted under license by [employer] (which disclaims liability or warranty for this information). If you have questions about a medical condition or this instruction, always ask your healthcare professional. [place] [address] any warranty or liability for your use of this information. Introducing hospitals & HEALTH SERVICES! [place] introduces [employer] patient portal. Now you can access parts of your medical record, email your doctor's office, and request medication refills online. 1. In your internet browser, go to [email or web address] 2. Click on the First Time User? Click Here link in the Sign In box. You will see the New Member Sign Up page. 3. Enter your [employer] Access Code exactly as it appears below. You will not need to use this code after youve completed the sign-up process. If you do not sign up before the expiration date, you must request a new code. · [employer] Access Code: [email or web address] Expires: 8/9/2018 11:02 AM 
 
4. Enter the last four digits of your Social Security Number (xxxx) and Date of Birth (mm/dd/yyyy) as indicated and click Submit. You will be taken to the next sign-up page. 5. Create a [employer] ID. This will be your [employer] login ID and cannot be changed, so think of one that is secure and easy to remember. 6. Create a [employer] password. You can change your password at any time. 7. Enter your Password Reset Question and Answer. This can be used at a later time if you forget your password. 8. Enter your e-mail address. You will receive e-mail notification when new information is available in [address] 9. Click Sign Up. You can now view and download portions of your medical record. 10. Click the Download Summary menu link to download a portable copy of your medical information. If you have questions, please visit the Frequently Asked Questions section of the [employer] website. Remember, [employer] is NOT to be used for urgent needs. For medical emergencies, dial 911. Now available from your iPhone and Android! Please provide this summary of care documentation to your next provider. If you have any questions after today's visit, please call [phone].

## 2018-05-11 NOTE — PROGRESS NOTES
Annual exam ages 40-58    [address] Rich is a ,  40 y.o. female OTHER Patient's last menstrual period was 2018 (exact date). , who presents for her annual checkup. Since her last annual GYN exam on 3/9/2017 ago,  she has the following changes in her health history:  - Just saw PCP. Vitamin D level low. ADDITIONAL CONCERNS:  She c/o of headaches for the past 2 months. She states she stopped taking her Mircette a few months ago. \"did not really give it a chance\" only took for a couple of months, had problems taking consistently. HA start ~7-10d before period, really bad first day of period, then gets better,       Cont to c/o hirsutism. Labs drawn previously, testosterone levels actually a little low:  Labs (9/14/15 - for hirsutism)  total testosterone=5  free testosterone=1.0  DHEA-S=126  17-MVP=970  SHBG=66.8    Labs (14 - for hirsutism):  total testosterone=6  free testosterone <0.2  17-OHP=29  DHEA-s=121  SHBG=72.6    With regard to the Gardasil vaccine, she is older than the age for which it is FDA approved. Menstrual status:    Her periods are moderate in flow. She is using five to ten pads or tampons per day, usually regular every 26-30 days. She denies dysmenorrhea. She denies premenstrual symptoms. Contraception:    The current method of family planning is abstinence. She is not currently sexually active. Sexual history:     reports that she does not currently engage in sexual activity but has had male partners.; She reports using the following methods of birth control/protection: None and Abstinence. Pap and Mammogram History:    Her most recent Pap smear was normal, HPV was not tested, obtained 3/9/2017 year(s) ago. She has hx of ASCUS/+HPV on 2015. Patient denied colposcopy. Pap repeated on 2016, normal/-HPV.      Pap/HPV (2014) N/N  Pap (9/14/15) ASCUS ; HPV Positive [done at pt's request]  -> rec colpo, pt declined, requested rpt pap  Pap/HPV (16) N/N  Pap (3/9/17) neg (HPV not tested)    She would like to have pap/HPV repeated today. Concerned about pap hx. Has been abstinent since 1996-3738. Pap/HPV was neg in , but then tested positive in . The patient had her mammogram today in our office. Osteoporosis History:    Family history does not include a first or second degree relative with osteopenia or osteoporosis. A bone density scan was not obtained. She is currently taking calcium, 50,000 daily. Past Medical History:   Diagnosis Date    Abnormal Pap smear of cervix 2015    ASCUS ; HPV Positive -> rec colpo, pt declined. H/o Colpo in college. Not sure of details, thinks she was tx'd with cryo    Hx of mammogram 2017    Negative     Routine Papanicolaou smear 2017    Negative (no hpv)     Screening for malignant neoplasm of the cervix 14    Negative ; HPV Negative     Past Surgical History:   Procedure Laterality Date    HX  SECTION  2013    HX COLPOSCOPY  ~2000    not sure of details. ?cryo done? OB History    Para Term  AB Living   1 1 1   1   SAB TAB Ectopic Molar Multiple Live Births        1      # Outcome Date GA Lbr Gagandeep/2nd Weight Sex Delivery Anes PTL Lv   1 Term 02/10/13 40w0d 23:00 5 lb 8 oz (2.495 kg) M  CL Spinal N TEGAN      Birth Comments: Cord wrapped around baby's nexk - emerg. c/s          Current Outpatient Prescriptions   Medication Sig Dispense Refill    acyclovir (ZOVIRAX) 200 mg capsule   4    ergocalciferol (ERGOCALCIFEROL) 50,000 unit capsule   10    clotrimazole-betamethasone (LOTRISONE) topical cream Apply to affected area BID x1-2wks 45 g 0    desog-e.estradiol/e.estradiol (MIRCETTE, 28,) 0.15-0.02 mgx21 /0.01 mg x 5 tab Take 1 Tab by mouth daily.  1 Package 3     Allergies: Sulfa (sulfonamide antibiotics)   Social History     Social History    Marital status: SINGLE     Spouse name: N/A    Number of children: N/A    Years of education: N/A     Occupational History    Not on file. Social History Main Topics    Smoking status: Never Smoker    Smokeless tobacco: Never Used      Comment: Does not use vapor and e-cigs    Alcohol use No    Drug use: No    Sexual activity: Not Currently     Partners: Male     Birth control/ protection: None, Abstinence     Other Topics Concern    Not on file     Social History Narrative     Tobacco History:  reports that she has never smoked. She has never used smokeless tobacco.  Alcohol Abuse:  reports that she does not drink alcohol. Drug Abuse:  reports that she does not use illicit drugs.     Patient Active Problem List   Diagnosis Code    Abnormal Pap smear of cervix R87.619     Family History   Problem Relation Age of Onset    Breast Cancer Neg Hx        Review of Systems - History obtained from the patient  Constitutional: negative for weight loss, fever, night sweats  HEENT: negative for hearing loss, earache, congestion, snoring, sorethroat  CV: negative for chest pain, palpitations, edema  Resp: negative for cough, shortness of breath, wheezing  GI: negative for change in bowel habits, abdominal pain, black or bloody stools  : negative for frequency, dysuria, hematuria, vaginal discharge  MSK: negative for back pain, joint pain, muscle pain  Breast: negative for breast lumps, nipple discharge, galactorrhea  Skin :negative for itching, rash, hives  Neuro: negative for dizziness, headache, confusion, weakness  Psych: negative for anxiety, depression, change in mood  Heme/lymph: negative for bleeding, bruising, pallor    Physical Exam    Visit Vitals    /81    Ht 5' 1.5\" (1.562 m)    Wt 124 lb 3.2 oz (56.3 kg)    LMP 04/24/2018 (Exact Date)    BMI 23.09 kg/m2       Constitutional  · Appearance: well-nourished, well developed, alert, in no acute distress    HENT  · Head and Face: appears normal    Neck  · Inspection/Palpation: normal appearance, no masses or tenderness  · Lymph Nodes: no lymphadenopathy present  · Thyroid: gland size normal, nontender, no nodules or masses present on palpation    Chest  · Respiratory Effort: breathing unlabored  · Auscultation: normal breath sounds    Cardiovascular  · Heart:  · Auscultation: regular rate and rhythm without murmur    Breasts  · Inspection of Breasts: breasts symmetrical, no skin changes, no discharge present, nipple appearance normal, no skin retraction present  · Palpation of Breasts and Axillae: no masses present on palpation, no breast tenderness  · Axillary Lymph Nodes: no lymphadenopathy present    Gastrointestinal  · Abdominal Examination: abdomen non-tender to palpation, normal bowel sounds, no masses present  · Liver and spleen: no hepatomegaly present, spleen not palpable  · Hernias: no hernias identified    Genitourinary  · External Genitalia: normal appearance for age, no discharge present, no tenderness present, no inflammatory lesions present, no masses present, no atrophy present  · Vagina: normal vaginal vault without central or paravaginal defects, no discharge present, no inflammatory lesions present, no masses present  · Bladder: non-tender to palpation  · Urethra: appears normal  · Cervix: normal   · Uterus: normal size, shape and consistency  · Adnexa: no adnexal tenderness present, no adnexal masses present  · Perineum: perineum within normal limits, no evidence of trauma, no rashes or skin lesions present  · Anus: anus within normal limits, no hemorrhoids present  · Inguinal Lymph Nodes: no lymphadenopathy present    Skin  · General Inspection: no rash, no lesions identified    Neurologic/Psychiatric  · Mental Status:  · Orientation: grossly oriented to person, place and time  · Mood and Affect: mood normal, affect appropriate        Assessment & Plan:  · Routine gynecologic examination. Pap/HPV done at pt's request.  · H/o ASCUS/+HPV. Pap as above. · HA. Some menstrual? Others seem to come throughout the month. Suggest HA diary. Can f/u with PCP. Discussed trying Mircette again (only tried for ~2 months). Declines for now. Can call if wants RX. · Hirsutism. Labs nl/low in past.  · Counseled re: diet, exercise, healthy lifestyle  · Return for yearly wellness visits  · Rec annual mammogram.  Done today in our office. · Patient verbalized understanding           Orders Placed This Encounter    PAP IG, HPV AND RFX HPV 87/12,65([id number])     Order Specific Question:   Pap Source? Answer:   Vaginal and Endocervical     Order Specific Question:   Total Hysterectomy? Answer:   No     Order Specific Question:   Supracervical Hysterectomy? Answer:   No     Order Specific Question:   Post Menopausal?     Answer:   No     Order Specific Question:   Hormone Therapy? Answer:   No     Order Specific Question:   IUD? Answer:   No     Order Specific Question:   Abnormal Bleeding? Answer:   No     Order Specific Question:   Pregnant     Answer:   No     Order Specific Question:   Post Partum? Answer:    No

## 2018-05-11 NOTE — PATIENT INSTRUCTIONS
Breast Self-Exam: Care Instructions  Your Care Instructions    A breast self-exam is when you check your breasts for lumps or changes. This regular exam helps you learn how your breasts normally look and feel. Most breast problems or changes are not because of cancer. Breast self-exam is not a substitute for a mammogram. Having regular breast exams by your doctor and regular mammograms improve your chances of finding any problems with your breasts. Some women set a time each month to do a step-by-step breast self-exam. Other women like a less formal system. They might look at their breasts as they brush their teeth, or feel their breasts once in a while in the shower. If you notice a change in your breast, tell your doctor. Follow-up care is a key part of your treatment and safety. Be sure to make and go to all appointments, and call your doctor if you are having problems. It's also a good idea to know your test results and keep a list of the medicines you take. How do you do a breast self-exam?  · The best time to examine your breasts is usually one week after your menstrual period begins. Your breasts should not be tender then. If you do not have periods, you might do your exam on a day of the month that is easy to remember. · To examine your breasts:  ¨ Remove all your clothes above the waist and lie down. When you are lying down, your breast tissue spreads evenly over your chest wall, which makes it easier to feel all your breast tissue. ¨ Use the pads-not the fingertips-of the 3 middle fingers of your left hand to check your right breast. Move your fingers slowly in small coin-sized circles that overlap. ¨ Use three levels of pressure to feel of all your breast tissue. Use light pressure to feel the tissue close to the skin surface. Use medium pressure to feel a little deeper. Use firm pressure to feel your tissue close to your breastbone and ribs.  Use each pressure level to feel your breast tissue before moving on to the next spot. ¨ Check your entire breast, moving up and down as if following a strip from the collarbone to the bra line, and from the armpit to the ribs. Repeat until you have covered the entire breast.  ¨ Repeat this procedure for your left breast, using the pads of the 3 middle fingers of your right hand. · To examine your breasts while in the shower:  ¨ Place one arm over your head and lightly soap your breast on that side. ¨ Using the pads of your fingers, gently move your hand over your breast (in the strip pattern described above), feeling carefully for any lumps or changes. ¨ Repeat for the other breast.  · Have your doctor inspect anything you notice to see if you need further testing. Where can you learn more? Go to http://xiomy-liu.info/. Enter P148 in the search box to learn more about \"Breast Self-Exam: Care Instructions. \"  Current as of: May 12, 2017  Content Version: 11.4  © 6212-2801 Healthwise, Incorporated. Care instructions adapted under license by [employer] (which disclaims liability or warranty for this information). If you have questions about a medical condition or this instruction, always ask your healthcare professional. [place] [address] any warranty or liability for your use of this information.

## 2018-05-16 LAB
CYTOLOGIST CVX/VAG CYTO: NORMAL
CYTOLOGY CVX/VAG DOC THIN PREP: NORMAL
DX ICD CODE: NORMAL
HPV I/H RISK 1 DNA CVX QL PROBE+SIG AMP: NEGATIVE
Lab: NORMAL
OTHER STN SPEC: NORMAL
PATH REPORT.FINAL DX SPEC: NORMAL
STAT OF ADQ CVX/VAG CYTO-IMP: NORMAL

## 2019-05-13 ENCOUNTER — OFFICE VISIT (OUTPATIENT)
Dept: OBGYN CLINIC | Age: 44
End: 2019-05-13

## 2019-05-13 VITALS
HEIGHT: 62 IN | WEIGHT: 115 LBS | BODY MASS INDEX: 21.16 KG/M2 | DIASTOLIC BLOOD PRESSURE: 80 MMHG | HEART RATE: 95 BPM | SYSTOLIC BLOOD PRESSURE: 121 MMHG

## 2019-05-13 DIAGNOSIS — Z11.3 SCREEN FOR STD (SEXUALLY TRANSMITTED DISEASE): ICD-10-CM

## 2019-05-13 DIAGNOSIS — Z32.02 NEGATIVE PREGNANCY TEST: ICD-10-CM

## 2019-05-13 DIAGNOSIS — N89.8 VAGINAL ODOR: ICD-10-CM

## 2019-05-13 DIAGNOSIS — N89.8 VAGINAL DISCHARGE: ICD-10-CM

## 2019-05-13 DIAGNOSIS — R30.0 DYSURIA: ICD-10-CM

## 2019-05-13 DIAGNOSIS — Z01.419 ENCOUNTER FOR WELL WOMAN EXAM WITH ROUTINE GYNECOLOGICAL EXAM: Primary | ICD-10-CM

## 2019-05-13 LAB
HCG URINE, QL. (POC): NEGATIVE
VALID INTERNAL CONTROL?: YES

## 2019-05-13 NOTE — PROGRESS NOTES
Annual exam ages 40-58 Heidi Guillory is a ,  44 y.o. female OTHER Patient's last menstrual period was 2019 (exact date). , who presents for her annual checkup. Since her last annual GYN exam about one year ago on 18,  she has the following changes in her health history: H/o HA. Some of which may have menstrual component. Has tried Mirecette in the past, only for a couple of months. H/o hirsutism. Previous labs actually showed testosterone levels to be a little low: 
Labs (9/14/15 - for hirsutism) total testosterone=5 
free testosterone=1.0 DHEA-S=126 
17-JXI=124 SHBG=66.8 Labs (14 - for hirsutism): 
total testosterone=6 
free testosterone <0.2 
17-OHP=29 DHEA-s=121 SHBG=72.6 ADDITIONAL CONCERNS: 
She is having no significant problems. Desires UPT. Unprotected IC . Hasn't been sexually active for about 6 years until January. Cycles are usually regular. LMP=5/1. Previous period ~4/2 or 4/3. Would like STD screening. Also c/o vaginal discharge and odor after IC, would like BV and yeast added to swab. Sx improving. Had dysuria as well. Would like testing for UTI. Dysuria has resolved. Asking about contraceptive options, but may want to try to get pregnant relatively soon. Things moving fast with new partner. Her son has autism. Askign about preconceptual testing. Results for orders placed or performed in visit on 19 AMB POC URINE PREGNANCY TEST, VISUAL COLOR COMPARISON Result Value Ref Range VALID INTERNAL CONTROL POC Yes HCG urine, Ql. (POC) Negative Negative With regard to the Gardasil vaccine, she is older than the age for which it is FDA approved. Menstrual status: 
 
Her periods are light in flow. She is using one to two pads or tampons per day, usually regular and last 26-30 days. She has some dysmenorrhea. She has some premenstrual symptoms. Contraception: The current method of family planning is none. Sexual history: 
 
 reports that she currently engages in sexual activity and has had partners who are Male. She reports using the following method of birth control/protection: None. Pap and Mammogram History: 
 
Pap/HPV (2014) N/N 
Pap (9/14/15) ASCUS ; HPV Positive [done at pt's request] 
-> rec colpo, pt declined, requested rpt pap Pap/HPV (16) N/N -> rpt 1yr Pap (3/9/17) Negative (no hpv) Pap (18) Negative ; HPV Negative The patient had her mammogram today in our office. Osteoporosis History: 
 
Family history does not include a first or second degree relative with osteopenia or osteoporosis. Past Medical History:  
Diagnosis Date  Abnormal Pap smear of cervix 2015 ASCUS ; HPV Positive -> rec colpo, pt declined. H/o Colpo in college. Not sure of details, thinks she was tx'd with cryo  Headache  Hx of mammogram 2018 Negative  Routine Papanicolaou smear 2017 Negative (no hpv)  Screening for malignant neoplasm of the cervix 14; 18 Negative ; HPV Negative Past Surgical History:  
Procedure Laterality Date  HX  SECTION  2013  HX COLPOSCOPY  ~  
 not sure of details. ?cryo done? OB History  Para Term  AB Living 1 1 1     1 SAB TAB Ectopic Molar Multiple Live Births 1  
  
# Outcome Date GA Lbr Gagandeep/2nd Weight Sex Delivery Anes PTL Lv  
1 Term 02/10/13 40w0d 23:00 5 lb 8 oz (2.495 kg) M  CL Spinal N TEGAN Birth Comments: Cord wrapped around baby's nexk - emerg. c/s Current Outpatient Medications Medication Sig Dispense Refill  norethindrone-e.estradiol-iron (LO LOESTRIN FE) 1 mg-10 mcg (24)/10 mcg (2) tab Take 1 Tab by mouth daily.  1 Package 3  
 acyclovir (ZOVIRAX) 200 mg capsule   4  
 ergocalciferol (ERGOCALCIFEROL) 50,000 unit capsule   10  
 clotrimazole-betamethasone (LOTRISONE) topical cream Apply to affected area BID x1-2wks 45 g 0  
 Allergies: Sulfa (sulfonamide antibiotics) Social History Socioeconomic History  Marital status: SINGLE Spouse name: Not on file  Number of children: Not on file  Years of education: Not on file  Highest education level: Not on file Occupational History  Not on file Social Needs  Financial resource strain: Not on file  Food insecurity:  
  Worry: Not on file Inability: Not on file  Transportation needs:  
  Medical: Not on file Non-medical: Not on file Tobacco Use  Smoking status: Never Smoker  Smokeless tobacco: Never Used  Tobacco comment: Does not use vapor and e-cigs Substance and Sexual Activity  Alcohol use: No  
  Alcohol/week: 0.0 oz  Drug use: No  
 Sexual activity: Yes  
  Partners: Male Birth control/protection: None Lifestyle  Physical activity:  
  Days per week: Not on file Minutes per session: Not on file  Stress: Not on file Relationships  Social connections:  
  Talks on phone: Not on file Gets together: Not on file Attends Mosque service: Not on file Active member of club or organization: Not on file Attends meetings of clubs or organizations: Not on file Relationship status: Not on file  Intimate partner violence:  
  Fear of current or ex partner: Not on file Emotionally abused: Not on file Physically abused: Not on file Forced sexual activity: Not on file Other Topics Concern  Not on file Social History Narrative  Not on file Tobacco History:  reports that she has never smoked. She has never used smokeless tobacco. 
Alcohol Abuse:  reports that she does not drink alcohol. Drug Abuse:  reports that she does not use drugs. Patient Active Problem List  
Diagnosis Code  Abnormal Pap smear of cervix R87.619 Family History Problem Relation Age of Onset  Breast Cancer Neg Hx Review of Systems - History obtained from the patient Constitutional: negative for weight loss, fever, night sweats HEENT: negative for hearing loss, earache, congestion, snoring, sorethroat CV: negative for chest pain, palpitations, edema Resp: negative for cough, shortness of breath, wheezing GI: negative for change in bowel habits, abdominal pain, black or bloody stools : negative for frequency, dysuria, hematuria, vaginal discharge MSK: negative for back pain, joint pain, muscle pain Breast: negative for breast lumps, nipple discharge, galactorrhea Skin :negative for itching, rash, hives Neuro: negative for dizziness, headache, confusion, weakness Psych: negative for anxiety, depression, change in mood Heme/lymph: negative for bleeding, bruising, pallor Physical Exam 
 
Visit Vitals /80 Pulse 95 Ht 5' 1.5\" (1.562 m) Wt 115 lb (52.2 kg) LMP 05/01/2019 (Exact Date) BMI 21.38 kg/m² Constitutional 
· Appearance: well-nourished, well developed, alert, in no acute distress HENT 
· Head and Face: appears normal 
 
Neck · Inspection/Palpation: normal appearance, no masses or tenderness · Lymph Nodes: no lymphadenopathy present · Thyroid: gland size normal, nontender, no nodules or masses present on palpation Chest 
· Respiratory Effort: breathing unlabored · Auscultation: normal breath sounds Cardiovascular · Heart: 
· Auscultation: regular rate and rhythm without murmur Breasts · Inspection of Breasts: breasts symmetrical, no skin changes, scant white discharge present, nipple appearance normal, no skin retraction present · Palpation of Breasts and Axillae: no masses present on palpation, no breast tenderness · Axillary Lymph Nodes: no lymphadenopathy present Gastrointestinal 
· Abdominal Examination: abdomen non-tender to palpation, normal bowel sounds, no masses present · Liver and spleen: no hepatomegaly present, spleen not palpable · Hernias: no hernias identified Genitourinary · External Genitalia: normal appearance for age, no discharge present, no tenderness present, no inflammatory lesions present, no masses present, no atrophy present · Vagina: normal vaginal vault without central or paravaginal defects, no discharge present, no inflammatory lesions present, no masses present · Bladder: non-tender to palpation · Urethra: appears normal 
· Cervix: normal, clear mucous at os c/w midcycle · Uterus: normal size, shape and consistency · Adnexa: no adnexal tenderness present, no adnexal masses present · Perineum: perineum within normal limits, no evidence of trauma, no rashes or skin lesions present · Anus: anus within normal limits, no hemorrhoids present · Inguinal Lymph Nodes: no lymphadenopathy present Skin · General Inspection: no rash, no lesions identified Neurologic/Psychiatric · Mental Status: · Orientation: grossly oriented to person, place and time · Mood and Affect: mood normal, affect appropriate Assessment & Plan: · Routine gynecologic examination. Pap/HPV done at pt's request. 
· H/o ASCUS/+HPV (2015). Declined colpo. Pap/HPV neg x2 (2016, 2018). Would like pap/HPV testing. Counseled that it is very likely that she may be HPV positive again as she has new partner. · Unprotected IC about a week ago. · Requests STD screening. Nuswab G/C/T. Will draw HIV, T.pallidum, HBsAg, HepC, HSV 2 (has h/o cold sores). Handout given. · Concerned about BV/yeast.  Added to Nuswab. · Recent dysuria, would like testing for UTI -> UCx. · Preconceptual counseling. Rec MVI/PNV/folate/DHA. Healthy life style, avoid T/E/D. Up to date with vaccinations. Address any dental issues prior to pregnancy. Discussed decreased fertility and increased risk for aneuploidy, SAB d/t AMA.   Asking about testing for this - advised only way to screen for aneuploidy is IVF with preimplantation genetics, would need to see fertility specialist, contact info given. Discussed no specific testing for autism. Handouts given. · Her current medical status is satisfactory with no evidence of significant gynecologic issues. · Would like trial of OCPs. eRX LoLoestrin #1 RFx3. Risk and benefits reviewed, including thromboembolic events. 1st Sunday start, backup method until second pack, continue for STD prevention. Handout given. RTO ~3months for side effect and BP check · Counseled re: diet, exercise, healthy lifestyle · Return for yearly wellness visits · Rec annual mammogram.  Will do today. · Gardasil counseling · Patient verbalized understanding Orders Placed This Encounter  CULTURE, URINE  
 HEP B SURFACE AG  
 HCV AB W/REFLEX VERIFICATION  
 HIV SCREEN, 4TH GEN. W/REFLEX CONFIRM  
 HSV TYPE 2-SPECIFIC ABS, IGG W/REFL SUPPLEMENTAL TESTING  T PALLIDUM SCREEN W/REFLEX  
 NUSWAB VAGINITIS PLUS  
 AMB POC URINE PREGNANCY TEST, VISUAL COLOR COMPARISON  norethindrone-e.estradiol-iron (LO LOESTRIN FE) 1 mg-10 mcg (24)/10 mcg (2) tab Sig: Take 1 Tab by mouth daily. Dispense:  1 Package Refill:  3  
 PAP IG, HPV AND RFX HPV 14/47,48([id number]) Order Specific Question:   Pap Source? Answer:   Cervical and Endocervical  
  Order Specific Question:   Total Hysterectomy? Answer:   No  
  Order Specific Question:   Supracervical Hysterectomy? Answer:   No  
  Order Specific Question:   Post Menopausal?  
  Answer:   No  
  Order Specific Question:   Hormone Therapy? Answer:   No  
  Order Specific Question:   IUD? Answer:   No  
  Order Specific Question:   Abnormal Bleeding? Answer:   No  
  Order Specific Question:   Pregnant Answer:   No  
  Order Specific Question:   Post Partum? Answer:    No

## 2019-05-13 NOTE — PATIENT INSTRUCTIONS

## 2019-05-15 LAB
A VAGINAE DNA VAG QL NAA+PROBE: ABNORMAL SCORE
BACTERIA UR CULT: NO GROWTH
BVAB2 DNA VAG QL NAA+PROBE: ABNORMAL SCORE
C ALBICANS DNA VAG QL NAA+PROBE: NEGATIVE
C GLABRATA DNA VAG QL NAA+PROBE: NEGATIVE
C TRACH RRNA SPEC QL NAA+PROBE: NEGATIVE
COMMENT, 144067: NORMAL
HBV SURFACE AG SERPL QL IA: NEGATIVE
HCV AB S/CO SERPL IA: <0.1 S/CO RATIO (ref 0–0.9)
HIV 1+2 AB+HIV1 P24 AG SERPL QL IA: NON REACTIVE
HSV2 IGG SER IA-ACNC: <0.91 INDEX (ref 0–0.9)
MEGA1 DNA VAG QL NAA+PROBE: ABNORMAL SCORE
N GONORRHOEA RRNA SPEC QL NAA+PROBE: NEGATIVE
T PALLIDUM AB SER QL IA: NEGATIVE
T VAGINALIS RRNA SPEC QL NAA+PROBE: NEGATIVE

## 2019-05-15 RX ORDER — METRONIDAZOLE 500 MG/1
500 TABLET ORAL 2 TIMES DAILY
Qty: 14 TAB | Refills: 0 | Status: SHIPPED | OUTPATIENT
Start: 2019-05-15 | End: 2019-05-22

## 2019-05-16 NOTE — PROGRESS NOTES
Patient notified and did not verbalized understanding right away. I had to explain it was not an STD nor HPV. After a few minutes of concerns, I repeated myself and finally she understood that is was only bacterial. Patient is currently fasting and may not  medication.

## 2019-05-17 ENCOUNTER — TELEPHONE (OUTPATIENT)
Dept: OBGYN CLINIC | Age: 44
End: 2019-05-17

## 2019-05-17 RX ORDER — METRONIDAZOLE 7.5 MG/G
1 GEL VAGINAL DAILY
Qty: 1 TUBE | Refills: 0 | Status: SHIPPED | OUTPATIENT
Start: 2019-05-17 | End: 2019-05-22

## 2019-05-17 NOTE — TELEPHONE ENCOUNTER
Patient called, left  on [facility] Refill line stating that she was given a rx for flagyl for BV confirmed on nuswab on 5/15/2019. Patient states she is fasting for Ramadan and cannot eat with this medication. She is requesting a rx for metrogel. Please advise.

## 2019-05-23 LAB
CYTOLOGIST CVX/VAG CYTO: ABNORMAL
CYTOLOGY CVX/VAG DOC CYTO: ABNORMAL
CYTOLOGY CVX/VAG DOC THIN PREP: ABNORMAL
DX ICD CODE: ABNORMAL
HPV I/H RISK 1 DNA CVX QL PROBE+SIG AMP: POSITIVE
HPV16 DNA CVX QL PROBE+SIG AMP: NEGATIVE
HPV18 DNA CVX QL PROBE+SIG AMP: NEGATIVE
Lab: ABNORMAL
OTHER STN SPEC: ABNORMAL
STAT OF ADQ CVX/VAG CYTO-IMP: ABNORMAL

## 2019-06-07 NOTE — PROGRESS NOTES
Here for colpo. Also requesting retest for BV. AE in May. Nuswab +BV. Tx'd with Metrogel (was fasting for Ramadan). Sx improved, but still some irritation with IC. H/o colpo in college, not sure of results. Thinks may have been tx'd with cryo.     Pap/HPV (2014) N/N  Pap (9/14/15) ASCUS ; HPV Positive [done at pt's request]  -> rec colpo, pt declined, requested rpt pap  Pap/HPV (16) N/N  Pap (3/9/17) neg (HPV not tested)  Pap/HPV (18) N/N  Pap (19) nl cells/+HPV, neg /18 -> rec colpo (prev +HPV, declined colpo)      A&P -   - nuswab for BV  - colpo -> [facility], bx x1 @ 2:00. [facility]  OFFICE PROCEDURE PROGRESS NOTE        Chart reviewed for the following:   Stu Caceres MD, have reviewed the History, Physical and updated the Allergic reactions for [address]. performed immediately prior to start of procedure:   Stu Caceres MD, have performed the following reviews on [address] [place] [address] Road prior to the start of the procedure:            * Patient was identified by name and date of birth   * Agreement on procedure being performed was verified  * Risks and Benefits explained to the patient  * Procedure site verified and marked as necessary  * Patient was positioned for comfort  * Consent was signed and verified     Time: 1055      Date of procedure: 6/10/2019    Procedure performed by:  Erica Dakins, MD    Provider assisted by: KARSON Chaves    Patient assisted by: self    How tolerated by patient: tolerated the procedure well with no complications    Post Procedural Pain Scale: 4 - Hurts Little More    Comments: none          Procedure Note: Colposcopy    Ana Connell is a ,  44 y.o. female OTHER Patient's last menstrual period was 2019 (exact date). She presents for colposcopy for evaluation of a cervical abnormality with a pap smear abnormality consisting of HPV.  After being presented with the risks, benefits and alternatives has she signed a consent for the procedure. She states that she understands the need for the procedure and has no further questions. She was informed that she may experience discomfort. Procedure:  She was positioned in the dorsal lithotomy position and a speculum was inserted into the vagina. Nuswab collected for BV/yeast.  Dilute acetic acid was applied to the cervix. The colposcope was used to visualize the cervix. Procedure: The transformation zone was completely visualized using q-tip to manipulate cervix. Findings: This colposcopy was satisfactory. The procedure was notable for white epithelium on the cervix. Biopsy was taken from the cervix @ 2:00. See accompanying diagram for biopsy sites. Silver nitrate was applied to the cervix. Endocervical currettage: An endocervical curettage was performed. Post Procedure Status: The patient tolerated the procedure well with minimal discomfort. She was observed for 10 minutes and released in good condition. Orders Placed This Encounter    COLPOSC,CERVIX W/ADJ VAG,W/BX & CURRETAG    NUSWAB VAGINOSIS + CANDIDA    AMB POC URINE PREGNANCY TEST, VISUAL COLOR COMPARISON    SURGICAL PATHOLOGY     Order Specific Question:   Type of Specimen and Locations?      Answer:   [facility] and bioosy at 2:00

## 2019-06-10 ENCOUNTER — OFFICE VISIT (OUTPATIENT)
Dept: OBGYN CLINIC | Age: 44
End: 2019-06-10

## 2019-06-10 VITALS
WEIGHT: 114 LBS | HEART RATE: 88 BPM | DIASTOLIC BLOOD PRESSURE: 71 MMHG | BODY MASS INDEX: 20.98 KG/M2 | SYSTOLIC BLOOD PRESSURE: 118 MMHG | HEIGHT: 62 IN

## 2019-06-10 DIAGNOSIS — Z32.02 NEGATIVE PREGNANCY TEST: ICD-10-CM

## 2019-06-10 DIAGNOSIS — R87.618 PAP SMEAR ABNORMALITY OF CERVIX/HUMAN PAPILLOMAVIRUS (HPV) POSITIVE: Primary | ICD-10-CM

## 2019-06-10 DIAGNOSIS — N94.9 VAGINAL DISCOMFORT: ICD-10-CM

## 2019-06-10 LAB
HCG URINE, QL. (POC): NEGATIVE
VALID INTERNAL CONTROL?: YES

## 2019-06-10 NOTE — PATIENT INSTRUCTIONS
Colposcopy: What to Expect at [facility] Your Recovery You may feel some soreness in your vagina for a day or two if you had a biopsy. Some vaginal bleeding or discharge is normal for up to a week after a biopsy. The discharge may be dark-colored if a solution was put on your cervix. You can use a sanitary pad for the bleeding. It may take a week or two for you to get the test results. This care sheet gives you a general idea about how long it will take for you to recover. But each person recovers at a different pace. Follow the steps below to feel better as quickly as possible. How can you care for yourself at home? Activity 
  · You can return to work and most daily activities right after the test.  
Exercise 
  · Do not exercise for 1 day after the test.  
Medicines 
  · Your doctor will tell you if and when you can restart your medicines. He or she will also give you instructions about taking any new medicines.  
  · If you take blood thinners, such as warfarin (Coumadin), clopidogrel (Plavix), or aspirin, be sure to talk to your doctor. He or she will tell you if and when to start taking those medicines again. Make sure that you understand exactly what your doctor wants you to do.  
  · Take an over-the-counter pain medicine, such as acetaminophen (Tylenol), ibuprofen (Advil, Motrin), or naproxen (Aleve). Be safe with medicines. Read and follow all instructions on the label. Do not take two or more pain medicines at the same time unless the doctor told you to. Many pain medicines have acetaminophen, which is Tylenol. Too much acetaminophen (Tylenol) can be harmful. Other instructions 
  · Use a pad if you have some bleeding.  
  · Do not douche, have sexual intercourse, or use tampons for 1 week if you had a biopsy. This will allow time for your cervix to heal.  
  · You can take a bath or shower anytime after the test.  
Follow-up care is a key part of your treatment and safety.  Be sure to make and go to all appointments, and call your doctor if you are having problems. It's also a good idea to know your test results and keep a list of the medicines you take. When should you call for help? Call your doctor now or seek immediate medical care if: 
  · You have severe vaginal bleeding. This means that you are soaking through your usual pads or tampons each hour for 2 or more hours.  
  · You have pain that does not get better after you take pain medicine.  
  · You have signs of infection, such as: 
? Increased pain. ? Bad-smelling vaginal discharge. ? A fever.  
 Watch closely for any changes in your health, and be sure to contact your doctor if: 
  · You have questions or concerns. Where can you learn more? Go to http://xiomy-liu.info/. Enter M523 in the search box to learn more about \"Colposcopy: What to Expect at Home. \" Current as of: March 27, 2018 Content Version: 11.9 © 9173-4048 [employer]. Care instructions adapted under license by [employer] (which disclaims liability or warranty for this information). If you have questions about a medical condition or this instruction, always ask your healthcare professional. [address] any warranty or liability for your use of this information. MyChart Help Desk: 3-179.704.2298

## 2019-06-13 LAB
A VAGINAE DNA VAG QL NAA+PROBE: NORMAL SCORE
BVAB2 DNA VAG QL NAA+PROBE: NORMAL SCORE
C ALBICANS DNA VAG QL NAA+PROBE: NEGATIVE
C GLABRATA DNA VAG QL NAA+PROBE: NEGATIVE
DX ICD CODE: NORMAL
MEGA1 DNA VAG QL NAA+PROBE: NORMAL SCORE
PATH REPORT.FINAL DX SPEC: NORMAL
PATH REPORT.GROSS SPEC: NORMAL
PATH REPORT.SITE OF ORIGIN SPEC: NORMAL
PATHOLOGIST NAME: NORMAL
PAYMENT PROCEDURE: NORMAL

## 2019-06-14 ENCOUNTER — TELEPHONE (OUTPATIENT)
Dept: OBGYN CLINIC | Age: 44
End: 2019-06-14

## 2019-06-14 NOTE — TELEPHONE ENCOUNTER
Patient calling and given results and recommendations of recent nuswab and colpo results. Patient states she discussed with getting a prenatal vitamin rx sent to her pharmacy and it has not been sent. Please advise.

## 2019-06-14 NOTE — PROGRESS NOTES
Patient aware of results and MD recommendations by phone. Patient explained the results in detail and had a lengthy conversation regarding how HPV is transmitted and cleared. Patient will start PNV and eat a healthy diet to help boost her immune system. She was encouraged to keep her Annual Exams with pap smears to ensure that any abnormalities are detected sooner. Patient verbalized understanding.

## 2019-06-20 NOTE — PROGRESS NOTES
Added colpo to psh, tickled x 1 year. See telephone encounter--patient was notified of colpo and nuswab results.

## 2019-06-26 ENCOUNTER — TELEPHONE (OUTPATIENT)
Dept: OBGYN CLINIC | Age: 44
End: 2019-06-26

## 2019-06-26 NOTE — TELEPHONE ENCOUNTER
Patient calling stating that her insurance will not cover the rx for PNV that was original sent in and needed a generic PNV sent in.  rx sent in per Md order. Patient started asking questions about oral cancers for her partner. Patient given information and where to go. She was instructed to do self checks and her partner do self checks for anything out of the ordinary. She was directed to have her partner see his MD while if she has any questions, she can contact our office. Patient verbalized understanding.

## 2019-07-24 ENCOUNTER — TELEPHONE (OUTPATIENT)
Dept: OBGYN CLINIC | Age: 44
End: 2019-07-24

## 2019-07-24 NOTE — TELEPHONE ENCOUNTER
Patient of DM. She just started 2 months ago on Lo Loestrin. She is concerned because the first month she had a cycle. The second pack of pills she had spot bleeding for 3 days at cycle time. She feels that this is not right, she wants a full blown cycle. She has symptoms of cramping and headache along with some nausea. She is NOT taking the pills regularly and on time daily per patient. We discussed that OCP's effect people differently. It takes 3 months of taking them as directed consecutively to get an idea how your body will adjust. Can take Advil 800 mg around the clock x 72 hours to help with cramping and PMS symptoms. Keep diary. Spot bleeding can be considered to be a bonus to not having a period if pills are helping. Patient will take a uPT to make sure not pregnant before continuing pills and starting Ibuprofen. Keep diary and call if taking consistently and still not happy with results for consultation.

## 2019-08-12 ENCOUNTER — OFFICE VISIT (OUTPATIENT)
Dept: OBGYN CLINIC | Age: 44
End: 2019-08-12

## 2019-08-12 VITALS
WEIGHT: 116 LBS | BODY MASS INDEX: 21.56 KG/M2 | HEART RATE: 89 BPM | SYSTOLIC BLOOD PRESSURE: 119 MMHG | DIASTOLIC BLOOD PRESSURE: 78 MMHG

## 2019-08-12 DIAGNOSIS — N92.1 BREAKTHROUGH BLEEDING ON OCPS: ICD-10-CM

## 2019-08-12 DIAGNOSIS — Z30.41 ENCOUNTER FOR SURVEILLANCE OF CONTRACEPTIVE PILLS: Primary | ICD-10-CM

## 2019-08-12 NOTE — PATIENT INSTRUCTIONS
Abnormal Uterine Bleeding: Care Instructions Your Care Instructions Abnormal uterine bleeding (AUB) is irregular bleeding from the uterus that is longer or heavier than usual or does not occur at your regular time. Sometimes it is caused by changes in hormone levels. It can also be caused by growths in the uterus, such as fibroids or polyps. Sometimes a cause cannot be found. You may have heavy bleeding when you are not expecting your period. Your doctor may suggest a pregnancy test, if you think you are pregnant. Follow-up care is a key part of your treatment and safety. Be sure to make and go to all appointments, and call your doctor if you are having problems. It's also a good idea to know your test results and keep a list of the medicines you take. How can you care for yourself at home? · Be safe with medicines. Take pain medicines exactly as directed. ? If the doctor gave you a prescription medicine for pain, take it as prescribed. ? If you are not taking a prescription pain medicine, ask your doctor if you can take an over-the-counter medicine. · You may be low in iron because of blood loss. Eat a balanced diet that is high in iron and vitamin C. Foods rich in iron include red meat, shellfish, eggs, beans, and leafy green vegetables. Talk to your doctor about whether you need to take iron pills or a multivitamin. When should you call for help? Call 911 anytime you think you may need emergency care. For example, call if: 
  · You passed out (lost consciousness).  
 Call your doctor now or seek immediate medical care if: 
  · You have new or worse belly or pelvic pain.  
  · You have severe vaginal bleeding.  
  · You feel dizzy or lightheaded, or you feel like you may faint.  
 Watch closely for changes in your health, and be sure to contact your doctor if: 
  · You think you may be pregnant.  
  · Your bleeding gets worse.  
  · You do not get better as expected. Where can you learn more? Go to http://xiomy-liu.info/. Enter M919 in the search box to learn more about \"Abnormal Uterine Bleeding: Care Instructions. \" Current as of: February 19, 2019 Content Version: 12.1 © 0274-1731 [employer]orporated. Care instructions adapted under license by [employer] (which disclaims liability or warranty for this information). If you have questions about a medical condition or this instruction, always ask your healthcare professional. [address]  any warranty or liability for your use of this information. [employer] Mobile Desk: [phone]

## 2019-08-12 NOTE — PROGRESS NOTES
Contraception evaluation/followup    The patient is a 38 y.o.,  No LMP recorded. .     She is here for contraceptive counseling/folllow up. Her current method of family planning is OCP (estrogen/progesterone). The patient is sexually active. She states she is not satisfied with her current form of contraception because: \"Horrible\" \"On my cycle all the time\" \"Take b/w 11-12 everyday\". Has BTB and spotting. These concerns are moderate    Having significant BTB. Bleeding twice a month. Most recently bled 2nd week of current pack. Taking consistently, usually within the hour (did miss a pill a couple of times with the first pack). Still emotional.  Still getting HA right before her cycle. Her previous menses she describes as heavy, spotting, irregular lasting up to now. Pad or tampon count: changes every a few hours. Recently started couple therapy. Has been with current partner since January. Past Medical History:   Diagnosis Date    Abnormal Pap smear of cervix 2015; 20    ASCUS ; HPV Positive -> rec colpo, pt declined. H/o Colpo in college. Not sure of details, thinks she was tx'd with cryo; 20 Neg pap +HPV -16 -18--> colpo neg path    Headache     Hx of mammogram 2018    Negative     Routine Papanicolaou smear 2017    Negative (no hpv)     Screening for malignant neoplasm of the cervix 14; 18    Negative ; HPV Negative        Past Surgical History:   Procedure Laterality Date    HX  SECTION  2013    HX COLPOSCOPY  ~; 6/10/19    not sure of details.   ?cryo done?; 2019 neg path     Social History     Occupational History    Not on file   Tobacco Use    Smoking status: Never Smoker    Smokeless tobacco: Never Used    Tobacco comment: Does not use vapor and e-cigs   Substance and Sexual Activity    Alcohol use: No     Alcohol/week: 0.0 standard drinks    Drug use: No    Sexual activity: Yes     Partners: Male     Birth control/protection: Pill     Family History   Problem Relation Age of Onset    Breast Cancer Neg Hx        Allergies   Allergen Reactions    Sulfa (Sulfonamide Antibiotics) Hives     Prior to Admission medications    Medication Sig Start Date End Date Taking? Authorizing Provider   norethindrone-e.estradiol-iron (LO LOESTRIN FE) 1 mg-10 mcg (24)/10 mcg (2) tab Take 1 Tab by mouth daily. 5/13/19  Yes Leticia Goldmann, MD   acyclovir (ZOVIRAX) 200 mg capsule  7/9/15  Yes Provider, Historical   ergocalciferol (ERGOCALCIFEROL) 50,000 unit capsule  8/8/15  Yes Provider, Historical   prenatal multivit-ca-min-fe-fa (PRENATAL VITAMIN) tab Take 1 Tab by mouth daily. Please dispense any generic prenatal vitamin that her insurance will cover. 6/26/19   Leticia Goldmann, MD   EFX94-nqcd-rvibl acid-dha-fish (OB COMPLETE ONE) 40-10-1-300 mg cap Take 1 Cap by mouth daily. [facility]: [id number]. PCN: DON. GRP: [id number].  ID: [id number] 6/17/19   Leticia Goldmann, MD        Review of Systems - History obtained from the patient  Constitutional: negative for weight loss, fever, night sweats  HEENT: negative for hearing loss, earache, congestion, snoring, sorethroat  CV: negative for chest pain, palpitations, edema  Resp: negative for cough, shortness of breath, wheezing  Breast: negative for breast lumps, nipple discharge, galactorrhea  GI: negative for change in bowel habits, abdominal pain, black or bloody stools  : negative for frequency, dysuria, hematuria  MSK: negative for back pain, joint pain, muscle pain  Skin: negative for itching, rash, hives  Neuro: negative for dizziness, headache, confusion, weakness  Psych: negative for anxiety, depression, change in mood  Heme/lymph: negative for bleeding, bruising, pallor      Objective:    Visit Vitals  /78 (BP 1 Location: Right arm, BP Patient Position: Sitting)   Pulse 89   Wt 116 lb (52.6 kg)   BMI 21.56 kg/m²          PHYSICAL EXAMINATION    Constitutional  · Appearance: well-nourished, well developed, alert, in no acute distress    HENT  · Head and Face: appears normal    Skin  · General Inspection: no rash, no lesions identified    Neurologic/Psychiatric  · Mental Status:  · Orientation: grossly oriented to person, place and time  · Mood and Affect: mood normal, affect appropriate    Assessment:   BTB on LoLoestrin  Menstrual HA  emotional    Plan:   Reviewed options including changing OCPs, IUD, DMPA, Nuvaring, Nexplanon. Would like to try new pill. Tung [place] 24 #3 RFx3, begin with new pack. Nuvaring pamphlet given. [>50% of this 26-minute visit spent face-to-face counseling, and/or coordination of care]      Orders Placed This Encounter    norethindrone-e estradiol-iron (JUNEL FE 24) 1 mg-20 mcg (24)/75 mg (4) tab     Sig: Take 1 Tab by mouth daily.      Dispense:  3 Package     Refill:  3

## 2019-09-05 ENCOUNTER — TELEPHONE (OUTPATIENT)
Dept: OBGYN CLINIC | Age: 44
End: 2019-09-05

## 2019-09-05 NOTE — TELEPHONE ENCOUNTER
Patient calling stating that she was seen on 8/12/19 for BTB on lo loestrin and she was switched to [address] [place] [address],Floors 3,4, [address] 5 24 FE and she states she has been bleeding for 10 days now with no signs of stopping. She reports bad PMS symptoms, symptoms of feeling down and emotional.  She did have unprotected intercourse on 9/3/19. She is concerned that she cannot continue to bleed like this. She states she has been taking the pills at the exact time of everyday and has not missed any pills. Please advise.

## 2019-09-06 NOTE — TELEPHONE ENCOUNTER
Can switch to Junel 1.5/30. #3 RFx3, begin with next pack. Can stop taking any pill if she prefers. Can make appt if she wants to discuss other options.

## 2019-09-06 NOTE — TELEPHONE ENCOUNTER
Patient aware of MD recommendations and she states she will stop the pill and take a UPT in 2 weeks and call back.

## 2019-09-17 ENCOUNTER — OFFICE VISIT (OUTPATIENT)
Dept: OBGYN CLINIC | Age: 44
End: 2019-09-17

## 2019-09-17 VITALS
SYSTOLIC BLOOD PRESSURE: 128 MMHG | HEART RATE: 97 BPM | HEIGHT: 62 IN | DIASTOLIC BLOOD PRESSURE: 86 MMHG | BODY MASS INDEX: 21.35 KG/M2 | WEIGHT: 116 LBS

## 2019-09-17 DIAGNOSIS — N92.6 IRREGULAR MENSES: ICD-10-CM

## 2019-09-17 DIAGNOSIS — R10.31 RLQ ABDOMINAL PAIN: ICD-10-CM

## 2019-09-17 DIAGNOSIS — N89.8 VAGINAL ODOR: ICD-10-CM

## 2019-09-17 DIAGNOSIS — Z30.09 ENCOUNTER FOR COUNSELING REGARDING CONTRACEPTION: ICD-10-CM

## 2019-09-17 DIAGNOSIS — Z11.3 SCREEN FOR STD (SEXUALLY TRANSMITTED DISEASE): ICD-10-CM

## 2019-09-17 DIAGNOSIS — N89.8 VAGINAL DISCHARGE: Primary | ICD-10-CM

## 2019-09-17 DIAGNOSIS — R35.0 URINARY FREQUENCY: ICD-10-CM

## 2019-09-17 DIAGNOSIS — Z32.02 NEGATIVE PREGNANCY TEST: ICD-10-CM

## 2019-09-17 LAB
BILIRUB UR QL STRIP: NEGATIVE
GLUCOSE UR-MCNC: NEGATIVE MG/DL
HCG URINE, QL. (POC): NEGATIVE
KETONES P FAST UR STRIP-MCNC: NORMAL MG/DL
PH BODY FLUID, POCT, PHBFPOCT: 5
PH UR STRIP: 4.6 [PH] (ref 4.6–8)
PROT UR QL STRIP: NORMAL
SOURCE POCT, SRCEPOCT: ABNORMAL
SP GR UR STRIP: 1.01 (ref 1–1.03)
UA UROBILINOGEN AMB POC: NORMAL (ref 0.2–1)
URINALYSIS CLARITY POC: NORMAL
URINALYSIS COLOR POC: YELLOW
URINE BLOOD POC: NORMAL
URINE LEUKOCYTES POC: NORMAL
URINE NITRITES POC: NEGATIVE
VALID INTERNAL CONTROL?: YES
WET MOUNT POCT, WMPOCT: NEGATIVE

## 2019-09-17 NOTE — PROGRESS NOTES
Problem Visit-Complete    Chief Complaint   Vaginitis      HPI  Javan Merida is a 38 y.o. female who presents for the evaluation of possible infection and pregnancy. Patient's last menstrual period was 2019 (approximate). The patient complains of lower abdominal pain, vaginal odor and itching and desires UPT d/t unprotected intercourse and irregular bleeding. Rectal itching  Milky vaginal discharge. Vaginal sx x4d. Vaginal sx worse after IC. Last IC was yesterday. Denies dyspareunia. Tx'd for BV a few months ago. Rpt swab was negative. Was on Junel 24. Stopped a couple of weeks ago. Had significant moodiness as well as irregular bleeding. Prior to that was on LoLoestrin. Has noticed significant improvement in mood since stopping. Lower abdominal pain x4days. Initially midline, dull. Now RLQ, sharp. Assoc with nausea x4days, diarrhea x1d. No vomiting. +urinary frequency. No fever. Does not radiate. The symptoms are moderate. Since then they have become unchanged. Aggravating factors: changing position can aggravate the pain. Alleviating factors: none. The patient denies fever. Past Medical History:   Diagnosis Date    Abnormal Pap smear of cervix 2015; 20    ASCUS ; HPV Positive -> rec colpo, pt declined. H/o Colpo in college. Not sure of details, thinks she was tx'd with cryo; 20 Neg pap +HPV -16 -18--> colpo neg path    Headache     Hx of mammogram 2018    Negative     Routine Papanicolaou smear 2017    Negative (no hpv)     Screening for malignant neoplasm of the cervix 14; 18    Negative ; HPV Negative     Past Surgical History:   Procedure Laterality Date    HX  SECTION  2013    HX COLPOSCOPY  ~; 6/10/19    not sure of details.   ?cryo done?; 2019 neg path     Social History     Occupational History    Not on file   Tobacco Use    Smoking status: Never Smoker    Smokeless tobacco: Never Used    Tobacco comment: Does not use vapor and e-cigs   Substance and Sexual Activity    Alcohol use: No     Alcohol/week: 0.0 standard drinks    Drug use: No    Sexual activity: Yes     Partners: Male     Birth control/protection: Pill     Family History   Problem Relation Age of Onset    Breast Cancer Neg Hx        Allergies   Allergen Reactions    Sulfa (Sulfonamide Antibiotics) Hives     Prior to Admission medications    Medication Sig Start Date End Date Taking? Authorizing Provider   acyclovir (ZOVIRAX) 200 mg capsule  7/9/15  Yes Provider, Historical   ergocalciferol (ERGOCALCIFEROL) 50,000 unit capsule  8/8/15  Yes Provider, Historical   norethindrone-e estradiol-iron (JUNEL FE 24) 1 mg-20 mcg (24)/75 mg (4) tab Take 1 Tab by mouth daily. 8/12/19   Washington Rodriguez MD   prenatal multivit-ca-min-fe-fa (PRENATAL VITAMIN) tab Take 1 Tab by mouth daily. Please dispense any generic prenatal vitamin that her insurance will cover. 6/26/19   Washington Rodriguez MD   OIU88-vdkb-kqbie acid-dha-fish (OB COMPLETE ONE) 40-10-1-300 mg cap Take 1 Cap by mouth daily. [facility] [id number]. PCN: CN. GRP: [id number].  ID: [id number] 6/17/19   Washington Rodriguez MD        Review of Systems: History obtained from the patient  Constitutional: negative for weight loss, fever, night sweats  HEENT: negative for hearing loss, earache, congestion, snoring, sorethroat  CV: negative for chest pain, palpitations, edema  Resp: negative for cough, shortness of breath, wheezing  Breast: negative for breast lumps, nipple discharge, galactorrhea  GI: see HPI  : see HPI  MSK: negative for back pain, joint pain, muscle pain  Skin: negative for itching, rash, hives  Neuro: negative for dizziness, headache, confusion, weakness  Psych: negative for anxiety, depression, change in mood  Heme/lymph: negative for bleeding, bruising, pallor    Objective:  Visit Vitals  /86 (BP 1 Location: Right arm, BP Patient Position: Sitting)   Pulse 97   Ht 5' 1.5\" (1.562 m)   Wt 116 lb (52.6 kg)   LMP 09/09/2019 (Approximate)   BMI 21.56 kg/m²       Physical Exam:     Constitutional  · Appearance: well-nourished, well developed, alert, in no acute distress    HENT  · Head and Face: appears normal    Back  · No CVA or flank tenderness    Gastrointestinal  · Abdominal Examination: RLQ minimally tender to palpation, normal bowel sounds, no masses present  · Liver and spleen: no hepatomegaly present, spleen not palpable  · Hernias: no hernias identified    Genitourinary  · External Genitalia: normal appearance for age, small amount thin yellow-white discharge present, no tenderness present, no inflammatory lesions present, no masses present, no atrophy present  · Vagina: normal vaginal vault without central or paravaginal defects, no discharge present, no inflammatory lesions present, no masses present  · Bladder: non-tender to palpation  · Urethra: appears normal  · Cervix: normal   · Uterus: normal size, shape and consistency  · Adnexa:  Right minimally tender to palpation, no masees; left - no adnexal tenderness present, no adnexal masses present  · Perineum: perineum within normal limits, no evidence of trauma, no rashes or skin lesions present  · Anus: anus within normal limits, no hemorrhoids present  · Inguinal Lymph Nodes: no lymphadenopathy present    Skin  · General Inspection: no rash, no lesions identified    Neurologic/Psychiatric  · Mental Status:  · Orientation: grossly oriented to person, place and time  · Mood and Affect: mood normal, affect appropriate    Results for orders placed or performed in visit on 09/17/19   AMB POC URINE PREGNANCY TEST, VISUAL COLOR COMPARISON   Result Value Ref Range    VALID INTERNAL CONTROL POC Yes     HCG urine, Ql. (POC) Negative Negative   AMB POC URINALYSIS DIP STICK AUTO W/O MICRO   Result Value Ref Range    Color (UA POC) Yellow     Clarity (UA POC) Slightly Cloudy     Glucose (UA POC) Negative Negative    Bilirubin (UA POC) Negative Negative    Ketones (UA POC) Trace Negative    Specific gravity (UA POC) 1.015 1.001 - 1.035    Blood (UA POC) 3+ Negative    pH (UA POC) 4.6 4.6 - 8.0    Protein (UA POC) Trace Negative    Urobilinogen (UA POC) 0.2-1 mg/dL 0.2 - 1    Nitrites (UA POC) Negative Negative    Leukocyte esterase (UA POC) Trace Negative   AMB POC PH, BODY FLUID EXCEPT BLOOD   Result Value Ref Range    pH,Body fluid (POC) 5.0     Source     AMB POC SMEAR, STAIN & INTERPRET, WET MOUNT   Result Value Ref Range    Wet mount (POC) negative          Assessment:  Vaginal discharge, odor. H/o BV. Req STD screen. RLQ pain associated with diarrhea  Urinary frequency  hematuria  H/o irregular cycles  Req UPT -> neg      Plan:   Nuswab plus. UCx  Suspect gastroenteritis for RLQ pain as she is also having diarrhea. F/u with PCP if sx persist.  Info given on Mirena, Nexplanon, Nuvaring.       Orders Placed This Encounter    CULTURE, URINE    NUSWAB VAGINITIS PLUS    AMB POC URINE PREGNANCY TEST, VISUAL COLOR COMPARISON    AMB POC URINALYSIS DIP STICK AUTO W/O MICRO    AMB POC PH, BODY FLUID EXCEPT BLOOD    AMB POC SMEAR, STAIN & INTERPRET, WET MOUNT

## 2019-09-19 LAB
A VAGINAE DNA VAG QL NAA+PROBE: ABNORMAL SCORE
BACTERIA UR CULT: NO GROWTH
BVAB2 DNA VAG QL NAA+PROBE: ABNORMAL SCORE
C ALBICANS DNA VAG QL NAA+PROBE: NEGATIVE
C GLABRATA DNA VAG QL NAA+PROBE: NEGATIVE
C TRACH RRNA SPEC QL NAA+PROBE: NEGATIVE
MEGA1 DNA VAG QL NAA+PROBE: ABNORMAL SCORE
N GONORRHOEA RRNA SPEC QL NAA+PROBE: NEGATIVE
T VAGINALIS RRNA SPEC QL NAA+PROBE: NEGATIVE

## 2019-09-20 ENCOUNTER — TELEPHONE (OUTPATIENT)
Dept: OBGYN CLINIC | Age: 44
End: 2019-09-20

## 2019-09-20 RX ORDER — METRONIDAZOLE 7.5 MG/G
1 GEL VAGINAL DAILY
Qty: 1 TUBE | Refills: 0 | Status: SHIPPED | OUTPATIENT
Start: 2019-09-20 | End: 2019-09-25

## 2019-09-20 NOTE — TELEPHONE ENCOUNTER
Patient aware of results and recommendations. This nurse spoke with the patient in length about this diagnosis and she states that she has a problem with recurring BV. She states that she feels that it is from intercourse. Patient advised that may be she and her partner needs to wash prior to having intercourse with a mild sensitive soap and avoid douching. She states she will try these to see if this helps. rx for metrogel sent to pharmacy.

## 2019-09-20 NOTE — TELEPHONE ENCOUNTER
----- Message from Montana Curtis MD sent at 9/19/2019 11:18 AM EDT -----  +BV. Please contact her to see which RX she would like. We discussed Metrogel, PO Flagyl, PO secnidazole.

## 2019-09-27 ENCOUNTER — TELEPHONE (OUTPATIENT)
Dept: OBGYN CLINIC | Age: 44
End: 2019-09-27

## 2019-09-27 RX ORDER — TERCONAZOLE 8 MG/G
1 CREAM VAGINAL
Qty: 20 G | Refills: 0 | Status: SHIPPED | OUTPATIENT
Start: 2019-09-27 | End: 2020-02-21

## 2019-09-27 NOTE — TELEPHONE ENCOUNTER
Patient calling stating that she was treated for BV (confirmed with nuswab) with metrogel on 9/20/2019 and now she has a yeast infection. She is requesting a rx (terazole) to treat this yeast infection. She has vaginal discharge, itching and irritation for a few days. Please advise.

## 2020-02-20 ENCOUNTER — TELEPHONE (OUTPATIENT)
Dept: OBGYN CLINIC | Age: 45
End: 2020-02-20

## 2020-02-20 NOTE — TELEPHONE ENCOUNTER
Call received at 3:10PM      38year old patient last seen in the office on 9/17/2019      Patient calling to say that she had been on an antibiotic and now she is having discharge, discomfort ,itching and odor since Monday. Patient placed on the schedule to be seen tomorrow with work in [place].Dr Rukhsana Reyes at 9:40am ( ok per AW)  Patient verbalized understanding.

## 2020-02-21 ENCOUNTER — TELEPHONE (OUTPATIENT)
Dept: OBGYN CLINIC | Age: 45
End: 2020-02-21

## 2020-02-21 ENCOUNTER — OFFICE VISIT (OUTPATIENT)
Dept: OBGYN CLINIC | Age: 45
End: 2020-02-21

## 2020-02-21 VITALS
WEIGHT: 116 LBS | HEIGHT: 61 IN | SYSTOLIC BLOOD PRESSURE: 113 MMHG | DIASTOLIC BLOOD PRESSURE: 73 MMHG | BODY MASS INDEX: 21.9 KG/M2

## 2020-02-21 DIAGNOSIS — N89.8 VAGINAL DISCHARGE: Primary | ICD-10-CM

## 2020-02-21 DIAGNOSIS — N89.8 VAGINA ITCHING: ICD-10-CM

## 2020-02-21 DIAGNOSIS — Z11.3 SCREEN FOR STD (SEXUALLY TRANSMITTED DISEASE): ICD-10-CM

## 2020-02-21 DIAGNOSIS — R30.0 DYSURIA: ICD-10-CM

## 2020-02-21 DIAGNOSIS — N89.8 VAGINAL ODOR: ICD-10-CM

## 2020-02-21 LAB
PH BODY FLUID, POCT, PHBFPOCT: 4.5
SOURCE POCT, SRCEPOCT: NORMAL
WET MOUNT POCT, WMPOCT: NEGATIVE

## 2020-02-21 NOTE — TELEPHONE ENCOUNTER
----- Message from Ruiz Chua sent at 2/21/2020 10:18 AM EST -----  Regarding: Pt requesting to be seen  Jessica Martinez is requesting to be seen. Patient called yesterday to say that she had been on an antibiotic and now she is having discharge, discomfort ,itching and odor since Monday. Patient placed on the schedule to be seen today with work in [place].Dr Payton Dandy at 9:40am, but  isn't here this morning.

## 2020-02-21 NOTE — PROGRESS NOTES
Chief Complaint   Vaginal Discharge      HPI  38 y.o. female complains of white vaginal discharge since Monday 2/10/2020 . Patient's last menstrual period was 2020 (exact date). She has additional symptoms at this time. Itching, fishy odor, cramping. Some dysuria. The patient  denies aggravating factors  Recently on Z-pack. Sx started a few days into course of abx, after IC. Has been tx'd for BV previously. Pt admits to unprotected sex and is interested in STD (swab) testing today. Past Medical History:   Diagnosis Date    Abnormal Pap smear of cervix 2015; 20    ASCUS ; HPV Positive -> rec colpo, pt declined. H/o Colpo in college. Not sure of details, thinks she was tx'd with cryo; 20 Neg pap +HPV -16 -18--> colpo neg path    Headache     Hx of mammogram 2018    Negative     Routine Papanicolaou smear 2017    Negative (no hpv)     Screening for malignant neoplasm of the cervix 14; 19    Negative ; HPV Negative     Past Surgical History:   Procedure Laterality Date    HX  SECTION  2013    HX COLPOSCOPY  ~2000; 6/10/19    not sure of details.   ?cryo done?; 2019 neg path     Social History     Occupational History    Not on file   Tobacco Use    Smoking status: Never Smoker    Smokeless tobacco: Never Used    Tobacco comment: Does not use vapor and e-cigs   Substance and Sexual Activity    Alcohol use: No     Alcohol/week: 0.0 standard drinks    Drug use: No    Sexual activity: Yes     Partners: Male     Birth control/protection: Pill     Family History   Problem Relation Age of Onset    Breast Cancer Neg Hx         Allergies   Allergen Reactions    Sulfa (Sulfonamide Antibiotics) Hives     Prior to Admission medications    Not on File                      Review of Systems - History obtained from the patient  Constitutional: negative for weight loss, fever, night sweats  Breast: negative for breast lumps, nipple discharge, galactorrhea  GI: negative for change in bowel habits, black or bloody stools  : +dysuria  MSK: negative for back pain, joint pain, muscle pain  Skin: negative for itching, rash, hives  Neuro: negative for dizziness, headache, confusion, weakness  Psych: negative for anxiety, depression, change in mood  Heme/lymph: negative for bleeding, bruising, pallor       Objective:    Visit Vitals  /73 (BP 1 Location: Right arm, BP Patient Position: Sitting)   Ht 5' 1\" (1.549 m)   Wt 116 lb (52.6 kg)   LMP 02/11/2020 (Exact Date)   BMI 21.92 kg/m²       Physical Exam:   PHYSICAL EXAMINATION    Constitutional  · Appearance: well-nourished, well developed, alert, in no acute distress    HENT  · Head and Face: appears normal    Genitourinary  · External Genitalia: normal appearance for age, no discharge present, no tenderness present, no inflammatory lesions present, no masses present, no atrophy present  · Vagina:  Minimal clear discharge present, otherwise normal vaginal vault without central or paravaginal defects, no inflammatory lesions present, no masses present  · Bladder: mildly tender to palpation  · Urethra: appears normal  · Cervix: normal   · Uterus: normal size, shape and consistency  · Adnexa: no adnexal tenderness present, no adnexal masses present  · Perineum: perineum within normal limits, no evidence of trauma, no rashes or skin lesions present  · Anus: anus within normal limits, no hemorrhoids present  · Inguinal Lymph Nodes: no lymphadenopathy present    Skin  · General Inspection: no rash, no lesions identified    Neurologic/Psychiatric  · Mental Status:  · Orientation: grossly oriented to person, place and time  · Mood and Affect: mood normal, affect appropriate        Results for orders placed or performed in visit on 02/21/20   AMB POC PH, BODY FLUID EXCEPT BLOOD   Result Value Ref Range    pH,Body fluid (POC) 4.5     Source     AMB POC SMEAR, STAIN & INTERPRET, WET MOUNT   Result Value Ref Range Wet mount (POC) negative     Narrative    WP/BELIA    Hypae: negative  Buds: negative  Whiff: negative    Wet Prep:  Trich: negative  Clue cells: negative  Hyphae: negative  Buds: negative  WBC's: normal         Assessment:   Vaginal itching, odor, discharge.   Minimal findings on exam.  STD screen  dysuria    Plan:   Nuswab Plus  UCx  eRX Metrogel    Orders Placed This Encounter    CULTURE, URINE    NUSWAB VAGINITIS PLUS ([facility])    AMB POC PH, BODY FLUID EXCEPT BLOOD    AMB POC SMEAR, STAIN & INTERPRET, WET MOUNT

## 2020-02-23 LAB — BACTERIA UR CULT: NO GROWTH

## 2020-02-24 ENCOUNTER — TELEPHONE (OUTPATIENT)
Dept: OBGYN CLINIC | Age: 45
End: 2020-02-24

## 2020-02-24 RX ORDER — METRONIDAZOLE 7.5 MG/G
1 GEL VAGINAL
Qty: 70 G | Refills: 0 | Status: SHIPPED | OUTPATIENT
Start: 2020-02-24 | End: 2020-02-29

## 2020-02-24 NOTE — TELEPHONE ENCOUNTER
Patient of DM. She said that she was communicating by [employer] and was advised that Metrogel would be sent into her pharmacy and was not. Nuswab is still pending. She has a clear to white vaginal discharge, fishy odor discharge, vaginal irritation and slight abdominal cramping.     [facility] [address]

## 2020-02-25 ENCOUNTER — TELEPHONE (OUTPATIENT)
Dept: OBGYN CLINIC | Age: 45
End: 2020-02-25

## 2020-02-25 LAB
A VAGINAE DNA VAG QL NAA+PROBE: ABNORMAL SCORE
BVAB2 DNA VAG QL NAA+PROBE: ABNORMAL SCORE
C ALBICANS DNA VAG QL NAA+PROBE: NEGATIVE
C GLABRATA DNA VAG QL NAA+PROBE: NEGATIVE
C TRACH DNA VAG QL NAA+PROBE: NEGATIVE
MEGA1 DNA VAG QL NAA+PROBE: ABNORMAL SCORE
N GONORRHOEA DNA VAG QL NAA+PROBE: NEGATIVE
T VAGINALIS DNA VAG QL NAA+PROBE: NEGATIVE

## 2020-02-25 NOTE — TELEPHONE ENCOUNTER
Calling for test results. She can not get Mychart to work. Metrogel has been called in. Advised to avoid soap as much as possible and probiotics. Osmar Marrero [[id number]] (Order [id number])   Lab   Date: 2/21/2020 Department: [facility] Ordering/Authorizing: Terryann Goltz, MD   Result Notes for NUSWAB VAGINITIS PLUS     Notes recorded by Terryann Goltz, MD on 2/25/2020 at 7:48 AM EST  Vaginal swab does show BV.  Negative for everything else. Notify pt.          Patient Result Comments     Written by Terryann Goltz, MD on 2/25/2020  7:48 AM   Vaginal swab does show BV.  Negative for everything else. CULTURE, URINE [[id number]] (Order [id number])   Microbiology   Date: 2/21/2020 Department: [facility] Ordering/Authorizing: Terryann Goltz, MD   Result Notes for CULTURE, URINE     Notes recorded by Yadira Mcfarland on 2/24/2020 at 1:33 PM EST  Viewed by Segun Valles on 2/23/2020 11:22 PM  ------    Notes recorded by Terryann Goltz, MD on 2/23/2020 at 10:45 PM EST  Negative/normal.  Notify pt.

## 2020-03-02 ENCOUNTER — TELEPHONE (OUTPATIENT)
Dept: OBGYN CLINIC | Age: 45
End: 2020-03-02

## 2020-03-02 RX ORDER — TERCONAZOLE 8 MG/G
1 CREAM VAGINAL
Qty: 20 G | Refills: 0 | Status: SHIPPED | OUTPATIENT
Start: 2020-03-02 | End: 2020-08-05

## 2020-03-02 NOTE — TELEPHONE ENCOUNTER
Patient advised of MD recommendations and prescription sent by MD.    Patient verbalized understanding.

## 2020-03-02 NOTE — TELEPHONE ENCOUNTER
Call received at 9:05am      38year old patient last seen in the office on 2/21/2020. Patient calling to say that she has completed her medication BV on 2/28/2020 and starting on 2/29/2020 she started with clumpy thick white discharge with mild itching and patient is requesting a prescription for yeast infection. Patient reports this has happened before. Patient is requesting the cream for yeast infection instead of the pill.     Pharmacy confirmed    Please advise

## 2020-06-22 NOTE — PROGRESS NOTES
Annual exam ages 40-58    [address] Aditi Rich is a ,  38 y.o. female OTHER Patient's last menstrual period was 2020., who presents for her annual checkup. Since her last annual GYN exam 20 she has the following changes in her health history: none. LV= 20 for BV  AE = 19     ADDITIONAL CONCERNS:  She is having burning, irritation, itchiness, no odor, minimal discharge (period ending). Sx usually around her period. Has had some problems with BV and yeast.    Menstrual status:    Her periods are usually heavy the first two days, and then moderate. Cycles are usually regular, about 28-30 days. Have gotten shorter. Contraception:    The current method of family planning is none. Sexual history:     reports being sexually active and has had partner(s) who are Male. She reports using the following method of birth control/protection: None. Pap and Mammogram History:    Her most recent Pap smear was normal, HPV was positive, obtained 19. She does have a history of abnormal pap smears. H/o colpo in college, not sure of results. Thinks may have been tx'd with cryo. Pap/HPV (2014) N/N  Pap (9/14/15) ASCUS ; HPV Positive [done at pt's request]  -> rec colpo, pt declined, requested rpt pap  Pap/HPV (16) N/N  Pap (3/9/17) neg (HPV not tested)  Pap/HPV (18) N/N  Pap (19) nl cells/+HPV, neg 16/18 -> rec colpo (prev +HPV, declined colpo)    The patient had her mammogram today in our office. Osteoporosis History:    Family history does include a first or second degree relative with osteopenia or osteoporosis. (mother, osteoporosis)    A bone density scan was never obtained. She is currently taking vit D. Past Medical History:   Diagnosis Date    Abnormal Pap smear of cervix 2015; 19    ASCUS ; HPV Positive -> rec colpo, pt declined. H/o Colpo in college.  Not sure of details, thinks she was tx'd with cryo; 19 Neg pap +HPV -16 -18--> colpo neg path    Headache     Hx of mammogram 2018    Negative     Routine Papanicolaou smear 2017    Negative (no hpv)     Screening for malignant neoplasm of the cervix 14; 18    Negative ; HPV Negative     Past Surgical History:   Procedure Laterality Date    HX  SECTION  2013    HX COLPOSCOPY  ~2000; 6/10/19    not sure of details. ?cryo done?; 2019 neg path     OB History    Para Term  AB Living   1 1 1     1   SAB TAB Ectopic Molar Multiple Live Births             1      # Outcome Date GA Lbr Gagandeep/2nd Weight Sex Delivery Anes PTL Lv   1 Term 02/10/13 40w0d 23:00 5 lb 8 oz (2.495 kg) M  CL Spinal N TEGAN      Birth Comments: Cord wrapped around baby's nexk - emerg. c/s       Current Outpatient Medications   Medication Sig Dispense Refill    acyclovir (ZOVIRAX) 400 mg tablet TAKE 1 TABLET BY MOUTH TWICE A DAY AS NEEDED COLD SORES FOR 5 DAYS AT A TIME      ergocalciferol (ERGOCALCIFEROL) 1,250 mcg (50,000 unit) capsule TAKE 1 CAPSULE ONCE A WEEK ORALLY 28      fexofenadine (ALLEGRA) 180 mg tablet TAKE 1 TABLET BY MOUTH EVERY DAY AS NEEDED FOR ALLERGIES      terconazole (TERAZOL 3) 0.8 % vaginal cream Insert 1 Applicator into vagina nightly.  20 g 0     Allergies: Sulfa (sulfonamide antibiotics)   Social History     Socioeconomic History    Marital status: SINGLE     Spouse name: Not on file    Number of children: Not on file    Years of education: Not on file    Highest education level: Not on file   Occupational History    Not on file   Social Needs    Financial resource strain: Not on file    Food insecurity     Worry: Not on file     Inability: Not on file    Transportation needs     Medical: Not on file     Non-medical: Not on file   Tobacco Use    Smoking status: Never Smoker    Smokeless tobacco: Never Used    Tobacco comment: Does not use vapor and e-cigs   Substance and Sexual Activity    Alcohol use: Never     Alcohol/week: 0.0 standard drinks     Frequency: Never    Drug use: Never    Sexual activity: Yes     Partners: Male     Birth control/protection: None   Lifestyle    Physical activity     Days per week: Not on file     Minutes per session: Not on file    Stress: Not on file   Relationships    Social connections     Talks on phone: Not on file     Gets together: Not on file     Attends Nondenominational service: Not on file     Active member of club or organization: Not on file     Attends meetings of clubs or organizations: Not on file     Relationship status: Not on file    Intimate partner violence     Fear of current or ex partner: Not on file     Emotionally abused: Not on file     Physically abused: Not on file     Forced sexual activity: Not on file   Other Topics Concern    Not on file   Social History Narrative    Not on file     Tobacco History:  reports that she has never smoked. She has never used smokeless tobacco.  Alcohol Abuse:  reports no history of alcohol use. Drug Abuse:  reports no history of drug use.     Patient Active Problem List   Diagnosis Code    Abnormal Pap smear of cervix R87.619     Family History   Problem Relation Age of Onset    Osteoporosis Mother     Breast Cancer Neg Hx        Review of Systems - History obtained from the patient  Constitutional: negative for weight loss, fever, night sweats  HEENT: negative for hearing loss, earache, congestion, snoring, sorethroat  CV: negative for chest pain, palpitations, edema  Resp: negative for cough, shortness of breath, wheezing  GI: negative for change in bowel habits, abdominal pain, black or bloody stools  : see HPI  MSK: negative for back pain, joint pain, muscle pain  Breast: negative for breast lumps, nipple discharge, galactorrhea  Skin :negative for itching, rash, hives  Neuro: negative for dizziness, headache, confusion, weakness  Psych: negative for anxiety, depression, change in mood  Heme/lymph: negative for bleeding, bruising, pallor    Physical Exam    Visit Vitals  /78 (BP 1 Location: Left arm, BP Patient Position: Sitting)   Ht 5' 1.5\" (1.562 m)   Wt 119 lb (54 kg)   LMP 06/18/2020   BMI 22.12 kg/m²       Constitutional  · Appearance: well-nourished, well developed, alert, in no acute distress    HENT  · Head and Face: appears normal    Neck  · Inspection/Palpation: normal appearance, no masses or tenderness  · Lymph Nodes: no lymphadenopathy present  · Thyroid: gland size normal, nontender, no nodules or masses present on palpation    Chest  · Respiratory Effort: breathing unlabored  · Auscultation: normal breath sounds    Cardiovascular  · Heart:  · Auscultation: regular rate and rhythm without murmur    Breasts  · Inspection of Breasts: breasts symmetrical, no skin changes, no discharge present, nipple appearance normal, no skin retraction present  · Palpation of Breasts and Axillae: no masses present on palpation, no breast tenderness  · Axillary Lymph Nodes: no lymphadenopathy present    Gastrointestinal  · Abdominal Examination: abdomen non-tender to palpation, normal bowel sounds, no masses present  · Liver and spleen: no hepatomegaly present, spleen not palpable  · Hernias: no hernias identified    Genitourinary  · External Genitalia: normal appearance for age, no discharge present, no tenderness present, no inflammatory lesions present, no masses present, no atrophy present  · Vagina: normal vaginal vault without central or paravaginal defects, no discharge present, no inflammatory lesions present, no masses present; vaginal dryness, uncomfortable with insertion of speculum  · Bladder: non-tender to palpation  · Urethra: appears normal  · Cervix: normal   · Uterus: normal size, shape and consistency  · Adnexa: no adnexal tenderness present, no adnexal masses present  · Perineum: perineum within normal limits, no evidence of trauma, no rashes or skin lesions present  · Anus: anus within normal limits, no hemorrhoids present  · Inguinal Lymph Nodes: no lymphadenopathy present    Skin  · General Inspection: no rash, no lesions identified    Neurologic/Psychiatric  · Mental Status:  · Orientation: grossly oriented to person, place and time  · Mood and Affect: mood normal, affect appropriate        Assessment & Plan:  · Routine gynecologic examination. Pap/HPV done  · +HPV with neg colpo last year. Pap/HPV as above. · Vaginal irritation, itching, dryness. Would like STD screen as well -> Nuswab Plus  · Dysuria -> UCx  · Counseled re: diet, exercise, healthy lifestyle  · Return for yearly wellness visits  · Rec annual mammogram  · Patient verbalized understanding           Orders Placed This Encounter    CULTURE, URINE    NUSWAB VAGINITIS PLUS    PAP IG, HPV AND RFX HPV 46/68,28([id number])     Order Specific Question:   Pap Source? Answer:   Cervical and Endocervical     Order Specific Question:   Total Hysterectomy? Answer:   No     Order Specific Question:   Supracervical Hysterectomy? Answer:   No     Order Specific Question:   Post Menopausal?     Answer:   No     Order Specific Question:   Hormone Therapy? Answer:   No     Order Specific Question:   IUD? Answer:   No     Order Specific Question:   Abnormal Bleeding? Answer:   No     Order Specific Question:   Pregnant     Answer:   No     Order Specific Question:   Post Partum? Answer:    No

## 2020-06-23 ENCOUNTER — OFFICE VISIT (OUTPATIENT)
Dept: OBGYN CLINIC | Age: 45
End: 2020-06-23

## 2020-06-23 VITALS
HEIGHT: 62 IN | SYSTOLIC BLOOD PRESSURE: 135 MMHG | BODY MASS INDEX: 21.9 KG/M2 | WEIGHT: 119 LBS | DIASTOLIC BLOOD PRESSURE: 78 MMHG

## 2020-06-23 DIAGNOSIS — N89.8 VAGINAL ITCHING: ICD-10-CM

## 2020-06-23 DIAGNOSIS — Z01.419 ENCOUNTER FOR GYNECOLOGICAL EXAMINATION: ICD-10-CM

## 2020-06-23 DIAGNOSIS — Z11.3 SCREENING FOR STD (SEXUALLY TRANSMITTED DISEASE): Primary | ICD-10-CM

## 2020-06-23 DIAGNOSIS — R87.810 CERVICAL HIGH RISK HPV (HUMAN PAPILLOMAVIRUS) TEST POSITIVE: ICD-10-CM

## 2020-06-23 DIAGNOSIS — R30.0 DYSURIA: ICD-10-CM

## 2020-06-23 DIAGNOSIS — Z12.4 SCREENING FOR CERVICAL CANCER: ICD-10-CM

## 2020-06-23 RX ORDER — ACYCLOVIR 400 MG/1
TABLET ORAL
COMMUNITY
Start: 2020-05-11

## 2020-06-23 RX ORDER — MINERAL OIL
ENEMA (ML) RECTAL
COMMUNITY
Start: 2020-05-05

## 2020-06-23 RX ORDER — ERGOCALCIFEROL 1.25 MG/1
CAPSULE ORAL
COMMUNITY
Start: 2020-04-15

## 2020-06-25 LAB — BACTERIA UR CULT: NO GROWTH

## 2020-06-26 LAB
A VAGINAE DNA VAG QL NAA+PROBE: NORMAL SCORE
BVAB2 DNA VAG QL NAA+PROBE: NORMAL SCORE
C ALBICANS DNA VAG QL NAA+PROBE: NEGATIVE
C GLABRATA DNA VAG QL NAA+PROBE: NEGATIVE
C TRACH DNA VAG QL NAA+PROBE: NEGATIVE
MEGA1 DNA VAG QL NAA+PROBE: NORMAL SCORE
N GONORRHOEA DNA VAG QL NAA+PROBE: NEGATIVE
T VAGINALIS DNA VAG QL NAA+PROBE: NEGATIVE

## 2020-07-03 LAB
CYTOLOGIST CVX/VAG CYTO: NORMAL
CYTOLOGY CVX/VAG DOC CYTO: NORMAL
CYTOLOGY CVX/VAG DOC THIN PREP: NORMAL
CYTOLOGY HISTORY:: NORMAL
DX ICD CODE: NORMAL
HPV I/H RISK 1 DNA CVX QL PROBE+SIG AMP: NEGATIVE
Lab: NORMAL
OTHER STN SPEC: NORMAL
STAT OF ADQ CVX/VAG CYTO-IMP: NORMAL

## 2020-07-10 NOTE — PROGRESS NOTES
Viewed by Amrik Gallo on 7/7/2020 10:39 AM   Written by Jen Lopez MD on 6/28/2020  7:08 AM   Vaginal swab is negative for STDs (chlamydia, gonorrhea, and trichomonas), as well as negative for BV and yeast.

## 2020-08-04 NOTE — PROGRESS NOTES
Chief Complaint   Vaginitis      HPI  Julio Cesar Narvaez is a 41 y.o. female. Patient's last menstrual period was 2020. She presents today for the evaluation of itching and irritation/dryness, especially during sex. Pt states this has been going on for over a month. Has had intermittently for at least a year. Has had some issues with recurrent BV. Had vaginal pain and postcoital dysuria with recent IC. Would like a urine culture sent. Had unprotected IC. Would like a pregnancy test performed. Pt asked to be given sexual health information, as well as discuss birth control options. Has been on variety of OCPs in past including Junel 24 (mood swings), LoLoEstrin (BTB) Mircette. Have discussed IUD in past, not interested at this time. Declines STD screen. Tested negative in . Past Medical History:   Diagnosis Date    Abnormal Pap smear of cervix 2015; 19    ASCUS ; HPV Positive -> rec colpo, pt declined. H/o Colpo in college. Not sure of details, thinks she was tx'd with cryo; 19 Neg pap +HPV -16 -18--> colpo neg path    H/O mammogram 2020    normal mammo    Headache     Hx of mammogram 2018    Negative     Routine Papanicolaou smear 2017    Negative (no hpv)     Screening for malignant neoplasm of the cervix 14; 18    Negative ; HPV Negative     Past Surgical History:   Procedure Laterality Date    HX  SECTION  2013    HX COLPOSCOPY  ~; 6/10/19    not sure of details.   ?cryo done?; 2019 neg path     Social History     Occupational History    Not on file   Tobacco Use    Smoking status: Never Smoker    Smokeless tobacco: Never Used    Tobacco comment: Does not use vapor and e-cigs   Substance and Sexual Activity    Alcohol use: Never     Alcohol/week: 0.0 standard drinks     Frequency: Never    Drug use: Never    Sexual activity: Yes     Partners: Male     Birth control/protection: None     Family History Problem Relation Age of Onset    Osteoporosis Mother     Breast Cancer Neg Hx        Allergies   Allergen Reactions    Sulfa (Sulfonamide Antibiotics) Hives     Prior to Admission medications    Medication Sig Start Date End Date Taking? Authorizing Provider   metroNIDAZOLE (METROGEL) 0.75 % gel Insert 5 g into vagina nightly for 5 days. 8/5/20 8/10/20 Yes Jennie Pandya MD   fluconazole (DIFLUCAN) 150 mg tablet Take 1 Tab by mouth daily for 1 day. FDA advises cautious prescribing of oral fluconazole in pregnancy. 8/5/20 8/6/20 Yes Jennie Pandya MD   terconazole (TERAZOL 3) 0.8 % vaginal cream Insert 1 Applicator into vagina nightly. 8/5/20  Yes Jennie Pandya MD   ethinyl estradiol-etonogestrel (NuvaRing) 0.12-0.015 mg/24 hr vaginal ring Place ring vaginally and leave in place for 3 weeks, remove for 1 week. Place new ring every 28 days.  8/5/20  Yes Dakota Keys MD   acyclovir (ZOVIRAX) 400 mg tablet TAKE 1 TABLET BY MOUTH TWICE A DAY AS NEEDED COLD SORES FOR 5 DAYS AT A TIME 5/11/20  Yes Provider, Historical   ergocalciferol (ERGOCALCIFEROL) 1,250 mcg (50,000 unit) capsule TAKE 1 CAPSULE ONCE A WEEK ORALLY 28 4/15/20  Yes Provider, Historical   fexofenadine (ALLEGRA) 180 mg tablet TAKE 1 TABLET BY MOUTH EVERY DAY AS NEEDED FOR ALLERGIES 5/5/20  Yes Provider, Historical        Review of Systems: History obtained from the patient  Constitutional: negative for weight loss, fever, night sweats  HEENT: negative for hearing loss, earache, congestion, snoring, sorethroat  CV: negative for chest pain, palpitations, edema  Resp: negative for cough, shortness of breath, wheezing  Breast: negative for breast lumps, nipple discharge, galactorrhea  GI: negative for change in bowel habits, abdominal pain, black or bloody stools  : see HPI  MSK: negative for back pain, joint pain, muscle pain  Skin: negative for itching, rash, hives  Neuro: negative for dizziness, headache, confusion, weakness  Psych: negative for anxiety, depression, change in mood  Heme/lymph: negative for bleeding, bruising, pallor    Objective:  Visit Vitals  /80 (BP 1 Location: Left arm, BP Patient Position: Sitting)   Ht 5' 1\" (1.549 m)   Wt 119 lb (54 kg)   LMP 07/16/2020   BMI 22.48 kg/m²       Physical Exam:   PHYSICAL EXAMINATION    Constitutional  · Appearance: well-nourished, well developed, alert, in no acute distress    HENT  · Head and Face: appears normal    Gastrointestinal  · Abdominal Examination: abdomen non-tender to palpation, normal bowel sounds, no masses present  · Liver and spleen: no hepatomegaly present, spleen not palpable  · Hernias: no hernias identified    Genitourinary  · External Genitalia: normal appearance for age, no discharge present, no tenderness present, no inflammatory lesions present, no masses present, no atrophy present  · Vagina: normal vaginal vault without central or paravaginal defects, scant white discharge present, no inflammatory lesions present, no masses present  · Bladder: non-tender to palpation  · Urethra: appears normal  · Cervix: normal   · Uterus: normal size, shape and consistency  · Adnexa: no adnexal tenderness present, no adnexal masses present  · Perineum: perineum within normal limits, no evidence of trauma, no rashes or skin lesions present  · Anus: anus within normal limits, no hemorrhoids present  · Inguinal Lymph Nodes: no lymphadenopathy present    Skin  · General Inspection: no rash, no lesions identified    Neurologic/Psychiatric  · Mental Status:  · Orientation: grossly oriented to person, place and time  · Mood and Affect: mood normal, affect appropriate    Results for orders placed or performed in visit on 08/05/20   AMB POC SMEAR, STAIN & INTERPRET, WET MOUNT   Result Value Ref Range    Wet mount (POC) +yeast, +occ clue cell    AMB POC URINE PREGNANCY TEST, VISUAL COLOR COMPARISON   Result Value Ref Range    VALID INTERNAL CONTROL POC Yes     HCG urine, Ql. (POC) Negative Negative   AMB POC PH, BODY FLUID EXCEPT BLOOD   Result Value Ref Range    pH,Body fluid (POC) 4.5     Source           Assessment:   +yeast  ?BV  H/o recurrent BV  Unprotected IC  dysuria    Plan:   Nuswab BV/yeast  eRX Diflucan, Terazol-3, Metrogel  UCx  eRX Nuvaring #3 RFx3. Risk and benefits reviewed, including thromboembolic events. 1st Sunday start, backup method until second ringl. Orders Placed This Encounter    CULTURE, URINE    NUSWAB VAGINOSIS + CANDIDA ([facility])    AMB POC WET PREP (AKA STAIN, INTERPRET, WET MOUNT)    AMB POC URINE PREGNANCY TEST, VISUAL COLOR COMPARISON    AMB POC PH, BODY FLUID EXCEPT BLOOD    metroNIDAZOLE (METROGEL) 0.75 % gel     Sig: Insert 5 g into vagina nightly for 5 days. Dispense:  1 Tube     Refill:  0    fluconazole (DIFLUCAN) 150 mg tablet     Sig: Take 1 Tab by mouth daily for 1 day. FDA advises cautious prescribing of oral fluconazole in pregnancy. Dispense:  1 Tab     Refill:  1    terconazole (TERAZOL 3) 0.8 % vaginal cream     Sig: Insert 1 Applicator into vagina nightly. Dispense:  20 g     Refill:  0    ethinyl estradiol-etonogestrel (NuvaRing) 0.12-0.015 mg/24 hr vaginal ring     Sig: Place ring vaginally and leave in place for 3 weeks, remove for 1 week. Place new ring every 28 days.      Dispense:  3 Each     Refill:  3

## 2020-08-05 ENCOUNTER — OFFICE VISIT (OUTPATIENT)
Dept: OBGYN CLINIC | Age: 45
End: 2020-08-05
Payer: COMMERCIAL

## 2020-08-05 VITALS
DIASTOLIC BLOOD PRESSURE: 80 MMHG | WEIGHT: 119 LBS | HEIGHT: 61 IN | BODY MASS INDEX: 22.47 KG/M2 | SYSTOLIC BLOOD PRESSURE: 122 MMHG

## 2020-08-05 DIAGNOSIS — Z32.00 ENCOUNTER FOR PREGNANCY TEST, RESULT UNKNOWN: ICD-10-CM

## 2020-08-05 DIAGNOSIS — N89.8 VAGINAL ITCHING: Primary | ICD-10-CM

## 2020-08-05 DIAGNOSIS — N89.8 VAGINAL DISCHARGE: ICD-10-CM

## 2020-08-05 DIAGNOSIS — Z30.09 GENERAL COUNSELING AND ADVICE FOR CONTRACEPTIVE MANAGEMENT: ICD-10-CM

## 2020-08-05 DIAGNOSIS — R30.0 DYSURIA: ICD-10-CM

## 2020-08-05 DIAGNOSIS — Z72.51 UNPROTECTED SEXUAL INTERCOURSE: ICD-10-CM

## 2020-08-05 DIAGNOSIS — N76.0 RECURRENT VAGINITIS: ICD-10-CM

## 2020-08-05 LAB
HCG URINE, QL. (POC): NEGATIVE
PH BODY FLUID, POCT, PHBFPOCT: 4.5
SOURCE POCT, SRCEPOCT: NORMAL
VALID INTERNAL CONTROL?: YES
WET MOUNT POCT, WMPOCT: ABNORMAL

## 2020-08-05 PROCEDURE — 83986 ASSAY PH BODY FLUID NOS: CPT | Performed by: OBSTETRICS & GYNECOLOGY

## 2020-08-05 PROCEDURE — 87210 SMEAR WET MOUNT SALINE/INK: CPT | Performed by: OBSTETRICS & GYNECOLOGY

## 2020-08-05 PROCEDURE — 81025 URINE PREGNANCY TEST: CPT | Performed by: OBSTETRICS & GYNECOLOGY

## 2020-08-05 PROCEDURE — 99214 OFFICE O/P EST MOD 30 MIN: CPT | Performed by: OBSTETRICS & GYNECOLOGY

## 2020-08-05 RX ORDER — TERCONAZOLE 8 MG/G
1 CREAM VAGINAL
Qty: 20 G | Refills: 0 | Status: SHIPPED | OUTPATIENT
Start: 2020-08-05 | End: 2020-08-07

## 2020-08-05 RX ORDER — ETONOGESTREL AND ETHINYL ESTRADIOL 11.7; 2.7 MG/1; MG/1
INSERT, EXTENDED RELEASE VAGINAL
Qty: 3 DEVICE | Refills: 0 | Status: CANCELLED | OUTPATIENT
Start: 2020-08-05

## 2020-08-05 RX ORDER — ETONOGESTREL AND ETHINYL ESTRADIOL 11.7; 2.7 MG/1; MG/1
INSERT, EXTENDED RELEASE VAGINAL
Qty: 3 EACH | Refills: 3 | Status: SHIPPED | OUTPATIENT
Start: 2020-08-05 | End: 2022-03-21

## 2020-08-05 RX ORDER — FLUCONAZOLE 150 MG/1
150 TABLET ORAL DAILY
Qty: 1 TAB | Refills: 1 | Status: SHIPPED | OUTPATIENT
Start: 2020-08-05 | End: 2020-08-06

## 2020-08-05 RX ORDER — METRONIDAZOLE 7.5 MG/G
1 GEL VAGINAL
Qty: 1 TUBE | Refills: 0 | Status: SHIPPED | OUTPATIENT
Start: 2020-08-05 | End: 2020-08-10

## 2020-08-07 ENCOUNTER — TELEPHONE (OUTPATIENT)
Dept: OBGYN CLINIC | Age: 45
End: 2020-08-07

## 2020-08-07 LAB — BACTERIA UR CULT: NO GROWTH

## 2020-08-07 RX ORDER — TERCONAZOLE 8 MG/G
1 CREAM VAGINAL
Qty: 20 G | Refills: 0 | Status: SHIPPED | OUTPATIENT
Start: 2020-08-07 | End: 2020-10-22

## 2020-08-07 NOTE — TELEPHONE ENCOUNTER
Call received at 655am    41year old patient last seen in the office on 8/5/2020    Patient calling to say that she out of town and her prescription for the terconazole (TERAZOL 3) 0.8 % vaginal cream has exploded and she would like a new prescription sent to a pharmacy in [place]. [place] [id number] [place] [address] [place] ? Anola Gentle to resent prescription as previously sent on 8/5/2020        Patient wondering about her labs . Patient advised that her swab is still pending.     Please review urine culture    Please advise

## 2020-08-07 NOTE — TELEPHONE ENCOUNTER
Patient advised of MD reviewed labs and prescription resent as per MD order to patient preferred pharmacy. Patient verbalized understanding.

## 2020-08-13 ENCOUNTER — TELEPHONE (OUTPATIENT)
Dept: OBGYN CLINIC | Age: 45
End: 2020-08-13

## 2020-08-13 NOTE — TELEPHONE ENCOUNTER
Patient is calling for her urine and nuswab results from 8/5/20:CULTURE, URINE: Result Notes       Zahira Contreras MD   8/7/2020 10:27 AM       Negative/normal.  Notify pt. (see telephone encounter)            Patient Result Comments     Written by Zahira Contreras MD on 8/7/2020 10:27 AM   Urine culture is negative. NUSWAB VAGINOSIS + CANDIDA [[id number]] (Order [id number])   Lab   Date: 8/5/2020  Department: [facility]  Ordering/Authorizing: Zahira Contreras MD    NUSWAB VAGINOSIS + CANDIDA: Result Notes       Zahira Contreras MD   8/10/2020  4:55 PM       Your vaginal swab is positive for yeast.  Negative for BV.  It should clear with the Diflucan and Terazol-3 prescribed at your visit. Both of these can take up to a week to work.    Please notify pt.

## 2020-10-22 ENCOUNTER — TELEPHONE (OUTPATIENT)
Dept: OBGYN CLINIC | Age: 45
End: 2020-10-22

## 2020-10-22 RX ORDER — FLUCONAZOLE 150 MG/1
150 TABLET ORAL
Qty: 1 TAB | Refills: 0 | Status: SHIPPED | OUTPATIENT
Start: 2020-10-22 | End: 2020-10-22

## 2020-10-22 RX ORDER — TERCONAZOLE 8 MG/G
1 CREAM VAGINAL
Qty: 20 G | Refills: 0 | Status: SHIPPED | OUTPATIENT
Start: 2020-10-22 | End: 2022-03-21

## 2020-10-22 NOTE — TELEPHONE ENCOUNTER
Call received at 12:17PM      41year old patient last seen in the office on 6/23/2020 for ae    Patient calling to say the past three days she has had vaginal discharge with itching and has not tried over the counter saying does not usually work for her . Patient is requesting a prescription to treat yeast infection.              [facility]      Please advise

## 2020-10-22 NOTE — TELEPHONE ENCOUNTER
eRX sent for Diflucan 150mg x1 to pharmacy on file. If sx recur/persist, will need to make offc appt.

## 2020-10-22 NOTE — TELEPHONE ENCOUNTER
Patient advised and does not want the pill she wants the Terazol as previously sent      rx pended      Please amend./sign

## 2021-06-15 ENCOUNTER — OFFICE VISIT (OUTPATIENT)
Dept: OBGYN CLINIC | Age: 46
End: 2021-06-15
Payer: COMMERCIAL

## 2021-06-15 VITALS
HEART RATE: 97 BPM | BODY MASS INDEX: 22.11 KG/M2 | SYSTOLIC BLOOD PRESSURE: 121 MMHG | DIASTOLIC BLOOD PRESSURE: 80 MMHG | WEIGHT: 117 LBS

## 2021-06-15 DIAGNOSIS — N89.8 VAGINAL DISCHARGE: Primary | ICD-10-CM

## 2021-06-15 DIAGNOSIS — N89.8 VAGINA ITCHING: ICD-10-CM

## 2021-06-15 DIAGNOSIS — R35.0 URINE FREQUENCY: ICD-10-CM

## 2021-06-15 DIAGNOSIS — Z11.3 SCREENING FOR STDS (SEXUALLY TRANSMITTED DISEASES): ICD-10-CM

## 2021-06-15 LAB
BILIRUB UR QL STRIP: NEGATIVE
GLUCOSE UR-MCNC: NEGATIVE MG/DL
HCG URINE, QL. (POC): NEGATIVE
KETONES P FAST UR STRIP-MCNC: NEGATIVE MG/DL
PH BODY FLUID, POCT, PHBFPOCT: 4.5
PH UR STRIP: 6.5 [PH] (ref 4.6–8)
PROT UR QL STRIP: NEGATIVE
SOURCE POCT, SRCEPOCT: NORMAL
SP GR UR STRIP: 1 (ref 1–1.03)
UA UROBILINOGEN AMB POC: NORMAL (ref 0.2–1)
URINALYSIS CLARITY POC: CLEAR
URINALYSIS COLOR POC: YELLOW
URINE BLOOD POC: NEGATIVE
URINE LEUKOCYTES POC: NEGATIVE
URINE NITRITES POC: NEGATIVE
VALID INTERNAL CONTROL?: YES
WET MOUNT POCT, WMPOCT: NEGATIVE

## 2021-06-15 PROCEDURE — 83986 ASSAY PH BODY FLUID NOS: CPT | Performed by: OBSTETRICS & GYNECOLOGY

## 2021-06-15 PROCEDURE — 87210 SMEAR WET MOUNT SALINE/INK: CPT | Performed by: OBSTETRICS & GYNECOLOGY

## 2021-06-15 PROCEDURE — 81003 URINALYSIS AUTO W/O SCOPE: CPT | Performed by: OBSTETRICS & GYNECOLOGY

## 2021-06-15 PROCEDURE — 81025 URINE PREGNANCY TEST: CPT | Performed by: OBSTETRICS & GYNECOLOGY

## 2021-06-15 PROCEDURE — 99214 OFFICE O/P EST MOD 30 MIN: CPT | Performed by: OBSTETRICS & GYNECOLOGY

## 2021-06-15 RX ORDER — NITROFURANTOIN 25; 75 MG/1; MG/1
100 CAPSULE ORAL 2 TIMES DAILY
Qty: 14 CAPSULE | Refills: 0 | Status: SHIPPED | OUTPATIENT
Start: 2021-06-15 | End: 2021-06-22

## 2021-06-15 RX ORDER — TERCONAZOLE 8 MG/G
1 CREAM VAGINAL
Qty: 20 G | Refills: 0 | Status: SHIPPED | OUTPATIENT
Start: 2021-06-15 | End: 2022-03-21

## 2021-06-15 NOTE — PROGRESS NOTES
Vaginitis evaluation    Chief Complaint   Vaginal discharge, burning, itching, frequency of urinating, nausea, odor that comes and goes and vaginal pain. Pt states she's been a committed relationship for over 3 yrs but is aware something is wrong vaginally and wants to be checked for all STI's, yeast, BV, and for a possible UTI. HPI  41 y.o. female complains of white vaginal discharge for 4 days. Patient's last menstrual period was 2021 (exact date). The patient denies aggravating factors. She is concerned about possible STI exposure at this time and would like to have blood drawn today as well. She denies exposure to new chemicals ot hygenic agents  Previous treatment included: none    Requests pregnancy test.    Past Medical History:   Diagnosis Date    Abnormal Pap smear of cervix 2015; 19    ASCUS ; HPV Positive -> rec colpo, pt declined. H/o Colpo in college. Not sure of details, thinks she was tx'd with cryo; 19 Neg pap +HPV -16 -18--> colpo neg path    H/O mammogram 2020    normal mammo    Headache     Hx of mammogram 2018    Negative     Routine Papanicolaou smear 2017    Negative (no hpv)     Screening for malignant neoplasm of the cervix 14; 18    Negative ; HPV Negative     Past Surgical History:   Procedure Laterality Date    HX  SECTION  2013    HX COLPOSCOPY  ~2000; 6/10/19    not sure of details.   ?cryo done?; 2019 neg path     Social History     Occupational History    Not on file   Tobacco Use    Smoking status: Never Smoker    Smokeless tobacco: Never Used    Tobacco comment: Does not use vapor and e-cigs   Substance and Sexual Activity    Alcohol use: Never     Alcohol/week: 0.0 standard drinks    Drug use: Never    Sexual activity: Yes     Partners: Male     Birth control/protection: None     Family History   Problem Relation Age of Onset    Osteoporosis Mother     Breast Cancer Neg Hx         Allergies   Allergen Reactions    Sulfa (Sulfonamide Antibiotics) Hives     Prior to Admission medications    Medication Sig Start Date End Date Taking? Authorizing Provider   terconazole (TERAZOL 3) 0.8 % vaginal cream Insert 1 Applicator into vagina nightly. 6/15/21  Yes Hannah Flood MD   nitrofurantoin, macrocrystal-monohydrate, (Macrobid) 100 mg capsule Take 1 Capsule by mouth two (2) times a day for 7 days. 6/15/21 6/22/21 Yes Hannah Flood MD   terconazole (TERAZOL 3) 0.8 % vaginal cream Insert 1 Applicator into vagina nightly. 10/22/20   Hannah Flood MD   ethinyl estradiol-etonogestrel (NuvaRing) 0.12-0.015 mg/24 hr vaginal ring Place ring vaginally and leave in place for 3 weeks, remove for 1 week. Place new ring every 28 days.  8/5/20   Hannah Flood MD   acyclovir (ZOVIRAX) 400 mg tablet TAKE 1 TABLET BY MOUTH TWICE A DAY AS NEEDED COLD SORES FOR 5 DAYS AT A TIME 5/11/20   Provider, Historical   ergocalciferol (ERGOCALCIFEROL) 1,250 mcg (50,000 unit) capsule TAKE 1 CAPSULE ONCE A WEEK ORALLY 28 4/15/20   Provider, Historical   fexofenadine (ALLEGRA) 180 mg tablet TAKE 1 TABLET BY MOUTH EVERY DAY AS NEEDED FOR ALLERGIES 5/5/20   Provider, Historical                      Review of Systems - History obtained from the patient  Constitutional: negative for weight loss, fever, night sweats  Breast: negative for breast lumps, nipple discharge, galactorrhea  GI: negative for change in bowel habits, abdominal pain, black or bloody stools  : negative for frequency, dysuria, hematuria  MSK: negative for back pain, joint pain, muscle pain  Skin: negative for itching, rash, hives  Neuro: negative for dizziness, headache, confusion, weakness  Psych: negative for anxiety, depression, change in mood  Heme/lymph: negative for bleeding, bruising, pallor       Objective:    Visit Vitals  /80   Pulse 97   Wt 117 lb (53.1 kg)   LMP 06/02/2021 (Exact Date)   BMI 22.11 kg/m²       Physical Exam:   PHYSICAL EXAMINATION    Constitutional  · Appearance: well-nourished, well developed, alert, in no acute distress    HENT  · Head and Face: appears normal    Gastrointestinal  · Abdominal Examination: abdomen soft, no guarding or rebound, suprapubic area mildly tender to palpation, no masses present  · Liver and spleen: no hepatomegaly present, spleen not palpable  · Hernias: no hernias identified      Genitourinary  · External Genitalia: normal appearance for age, no discharge present, no tenderness present, no inflammatory lesions present, no masses present, no atrophy present  · Vagina:  Small amount mixed clear and white particulate discharge present, no odor, otherwise normal vaginal vault without central or paravaginal defects, no inflammatory lesions present, no masses present  · Bladder: mildlytender to palpation  · Urethra: appears normal  · Cervix: normal   · Uterus: normal size, shape and consistency  · Adnexa: right - mild adnexal tenderness present, no adnexal masses present; left NT/NM  · Perineum: perineum within normal limits, no evidence of trauma, no rashes or skin lesions present  · Anus: anus within normal limits, no hemorrhoids present  · Inguinal Lymph Nodes: no lymphadenopathy present    Skin  · General Inspection: no rash, no lesions identified    Neurologic/Psychiatric  · Mental Status:  · Orientation: grossly oriented to person, place and time  · Mood and Affect: mood normal, affect appropriate        Results for orders placed or performed in visit on 06/15/21   AMB POC URINE PREGNANCY TEST, VISUAL COLOR COMPARISON   Result Value Ref Range    VALID INTERNAL CONTROL POC Yes     HCG urine, Ql. (POC) Negative Negative   AMB POC SMEAR, STAIN & INTERPRET, WET MOUNT   Result Value Ref Range    Wet mount (POC) negative    AMB POC PH, BODY FLUID EXCEPT BLOOD   Result Value Ref Range    pH,Body fluid (POC) 4.5     Source     AMB POC URINALYSIS DIP STICK AUTO W/O MICRO   Result Value Ref Range    Color (UA POC) Yellow     Clarity (UA POC) Clear     Glucose (UA POC) Negative Negative    Bilirubin (UA POC) Negative Negative    Ketones (UA POC) Negative Negative    Specific gravity (UA POC) 1.005 1.001 - 1.035    Blood (UA POC) Negative Negative    pH (UA POC) 6.5 4.6 - 8.0    Protein (UA POC) Negative Negative    Urobilinogen (UA POC) 0.2 mg/dL 0.2 - 1    Nitrites (UA POC) Negative Negative    Leukocyte esterase (UA POC) Negative Negative       Assessment:   Vaginal discharge and itching  Urinary frequency  Bladder tenderness  Requests STD screening. Plan:   nuswab plus  Will draw HIV, T.pallidum, HBsAg, HepC, HSV. Will tx empirically with Terazol-3 for yeast and macrobid for UTI    Orders Placed This Encounter    CULTURE, URINE     Standing Status:   Future     Number of Occurrences:   1     Standing Expiration Date:   6/15/2022    NUSWAB VAGINITIS PLUS ([facility])    HEP B SURFACE AG     Standing Status:   Future     Number of Occurrences:   1     Standing Expiration Date:   6/15/2022    HCV AB W/RFLX TO AVELINO     Standing Status:   Future     Number of Occurrences:   1     Standing Expiration Date:   6/15/2022    RPR     Standing Status:   Future     Number of Occurrences:   1     Standing Expiration Date:   6/15/2022    HIV SCREEN, 4TH GEN. W/REFLEX CONFIRM     Standing Status:   Future     Number of Occurrences:   1     Standing Expiration Date:   6/15/2022    HSV 1 AND 2-SPEC AB, IGG W/RFX TO SUPPL HSV-2 TESTING     Standing Status:   Future     Number of Occurrences:   1     Standing Expiration Date:   6/15/2022    AMB POC URINE PREGNANCY TEST, VISUAL COLOR COMPARISON    AMB POC SMEAR, STAIN & INTERPRET, WET MOUNT    AMB POC PH, BODY FLUID EXCEPT BLOOD    AMB POC URINALYSIS DIP STICK AUTO W/O MICRO    terconazole (TERAZOL 3) 0.8 % vaginal cream     Sig: Insert 1 Applicator into vagina nightly.      Dispense:  20 g     Refill:  0    nitrofurantoin, macrocrystal-monohydrate, (Macrobid) 100 mg capsule Sig: Take 1 Capsule by mouth two (2) times a day for 7 days.      Dispense:  14 Capsule     Refill:  0

## 2021-06-15 NOTE — PATIENT INSTRUCTIONS
Vaginitis: Care Instructions  Your Care Instructions     Vaginitis is soreness or infection of the vagina. This common problem can cause itching and burning. And it can cause a change in vaginal discharge. Sometimes it can cause pain during sex. Vaginitis may be caused by bacteria, yeast, or other germs. Some infections that cause it are caught from a sexual partner. Bath products, spermicides, and douches can irritate the vagina too. Some women have this problem during and after menopause. A drop in estrogen levels during this time can cause dryness, soreness, and pain during sex. Your doctor can give you medicine to treat an infection. And home care may help you feel better. For certain types of infections, your sex partner must be treated too. Follow-up care is a key part of your treatment and safety. Be sure to make and go to all appointments, and call your doctor if you are having problems. It's also a good idea to know your test results and keep a list of the medicines you take. How can you care for yourself at home? · If your doctor prescribed antibiotics, take them as directed. Do not stop taking them just because you feel better. You need to take the full course of antibiotics. · Take your medicines exactly as prescribed. Call your doctor if you think you are having a problem with your medicine. · Do not eat or drink anything that has alcohol if you are taking metronidazole (Flagyl). · If you have a yeast infection, use over-the-counter products as your doctor tells you to. Or take medicine your doctor prescribes exactly as directed. · Wash your vaginal area daily with water. You also can use a mild, unscented soap if you want. · Do not use scented bath products. And do not use vaginal sprays or douches. · Put a washcloth soaked in cool water on the area to relieve itching. Or you can take cool baths.   · If you have dryness because of menopause, use estrogen cream or pills that your doctor prescribes. · Ask your doctor about when it is okay to have sex. · Use a personal lubricant before sex if you have dryness. Examples are Astroglide, K-Y Jelly, and Wet Lubricant Gel. · Ask your doctor if your sex partner also needs treatment. When should you call for help? Call your doctor now or seek immediate medical care if:    · You have a fever and pelvic pain. Watch closely for changes in your health, and be sure to contact your doctor if:    · You have bleeding other than your period.     · You do not get better as expected. Where can you learn more? Go to http://www.gray.com/  Enter [id number] in the search box to learn more about \"Vaginitis: Care Instructions. \"  Current as of: July 17, 2020               Content Version: 12.8  © 5596-4740 [employer]. Care instructions adapted under license by [employer] (which disclaims liability or warranty for this information). If you have questions about a medical condition or this instruction, always ask your healthcare professional. [address] any warranty or liability for your use of this information.

## 2021-06-16 LAB
BACTERIA SPEC CULT: NORMAL
HBV SURFACE AG SER QL: 0.1 INDEX
HBV SURFACE AG SER QL: NEGATIVE
HIV 1+2 AB+HIV1 P24 AG SERPL QL IA: NONREACTIVE
HIV12 RESULT COMMENT, HHIVC: NORMAL
RPR SER QL: NONREACTIVE
SERVICE CMNT-IMP: NORMAL

## 2021-06-17 LAB
HCV AB S/CO SERPL IA: <0.1 S/CO RATIO (ref 0–0.9)
HCV AB SERPL QL IA: NORMAL
HSV1 IGG SER IA-ACNC: 36.3 INDEX (ref 0–0.9)
HSV2 IGG SER IA-ACNC: <0.91 INDEX (ref 0–0.9)

## 2021-08-13 NOTE — PROGRESS NOTES
Annual exam ages 40-58    [address] is a ,  40 y.o. female OTHER Patient's last menstrual period was 2021., who presents for her annual checkup. Since her last annual GYN exam about one year ago,  she has the following changes in her health history:   - none    ADDITIONAL CONCERNS:  She is having some concerns of yeast. Vag itching. Seen in . Nuswab plus neg. With regard to the Gardasil vaccine, she is older than the age for which it is FDA approved. Menstrual status:    Her periods are heavy in flow. She is using three to five pads or tampons per day, usually regular and last 26-30 days. She has dysmenorrhea. She has premenstrual symptoms. Contraception:    The current method of family planning is abstinence. Sexual history:     reports being sexually active and has had partner(s) who are Male. She reports using the following method of birth control/protection: None. Pap and Mammogram History:    Her most recent Pap smear was normal, HPV was neg, obtained 20. She does have a history of abnormal pap smears. Pap/HPV (2014) N/N   Pap (9/14/15) ASCUS ; HPV Positive [done at pt's request]   -> rec colpo, pt declined, requested rpt pap   Pap/HPV (16) N/N   Pap (3/9/17) neg (HPV not tested)   Pap/HPV (18) N/N   Pap (19) nl cells/+HPV, neg 16/18 -> colpo   colpo (6/10/19) ECC neg, cvx @ 2:00 neg   Pap/HPV (20) N/N -> rpt 3yrs per [facility]         The patient had her mammogram today in our office. Past Medical History:   Diagnosis Date    Abnormal Pap smear of cervix 2015; 20    ASCUS ; HPV Positive -> rec colpo, pt declined. H/o Colpo in college.  Not sure of details, thinks she was tx'd with cryo; 19 Neg pap +HPV -16 -18--> colpo neg path    H/O mammogram 2020    normal mammo    Headache     Hx of mammogram 2018    Negative     Routine Papanicolaou smear 2017    Negative (no hpv)     Screening for malignant neoplasm of the cervix 14; 18    Negative ; HPV Negative     Past Surgical History:   Procedure Laterality Date    HX  SECTION  2013    HX COLPOSCOPY  ~; 6/10/19    not sure of details. ?cryo done?; 2019 neg path     OB History    Para Term  AB Living   1 1 1     1   SAB TAB Ectopic Molar Multiple Live Births             1      # Outcome Date GA Lbr Gagandeep/2nd Weight Sex Delivery Anes PTL Lv   1 Term 02/10/13 40w0d 23:00 5 lb 8 oz (2.495 kg) M  CL Spinal N TEGAN      Birth Comments: Cord wrapped around baby's nexk - emerg. c/s       Current Outpatient Medications   Medication Sig Dispense Refill    acyclovir (ZOVIRAX) 400 mg tablet TAKE 1 TABLET BY MOUTH TWICE A DAY AS NEEDED COLD SORES FOR 5 DAYS AT A TIME      ergocalciferol (ERGOCALCIFEROL) 1,250 mcg (50,000 unit) capsule TAKE 1 CAPSULE ONCE A WEEK ORALLY 28      fexofenadine (ALLEGRA) 180 mg tablet TAKE 1 TABLET BY MOUTH EVERY DAY AS NEEDED FOR ALLERGIES      terconazole (TERAZOL 3) 0.8 % vaginal cream Insert 1 Applicator into vagina nightly. (Patient not taking: Reported on 2021) 20 g 0    terconazole (TERAZOL 3) 0.8 % vaginal cream Insert 1 Applicator into vagina nightly. (Patient not taking: Reported on 2021) 20 g 0    ethinyl estradiol-etonogestrel (NuvaRing) 0.12-0.015 mg/24 hr vaginal ring Place ring vaginally and leave in place for 3 weeks, remove for 1 week. Place new ring every 28 days.  (Patient not taking: Reported on 2021) 3 Each 3     Allergies: Sulfa (sulfonamide antibiotics)   Social History     Socioeconomic History    Marital status: SINGLE     Spouse name: Not on file    Number of children: Not on file    Years of education: Not on file    Highest education level: Not on file   Occupational History    Not on file   Tobacco Use    Smoking status: Never Smoker    Smokeless tobacco: Never Used    Tobacco comment: Does not use vapor and e-cigs   Substance and Sexual Activity    Alcohol use: Never     Alcohol/week: 0.0 standard drinks    Drug use: Never    Sexual activity: Yes     Partners: Male     Birth control/protection: None   Other Topics Concern    Not on file   Social History Narrative    Not on file     Social Determinants of Health     Financial Resource Strain:     Difficulty of Paying Living Expenses:    Food Insecurity:     Worried About Running Out of Food in the Last Year:     [address] in the Last Year:    Transportation Needs:     Lack of Transportation (Medical):  Lack of Transportation (Non-Medical):    Physical Activity:     Days of Exercise per Week:     Minutes of Exercise per Session:    Stress:     Feeling of Stress :    Social Connections:     Frequency of Communication with Friends and Family:     Frequency of Social Gatherings with Friends and Family:     Attends [employer] Services:     Active Member of Clubs or Organizations:     Attends Club or Organization Meetings:     Marital Status:    Intimate Partner Violence:     Fear of Current or Ex-Partner:     Emotionally Abused:     Physically Abused:     Sexually Abused: Tobacco History:  reports that she has never smoked. She has never used smokeless tobacco.  Alcohol Abuse:  reports no history of alcohol use. Drug Abuse:  reports no history of drug use.     Patient Active Problem List   Diagnosis Code    Abnormal Pap smear of cervix R87.619     Family History   Problem Relation Age of Onset    Osteoporosis Mother     Breast Cancer Paternal Grandmother        Review of Systems - History obtained from the patient  Constitutional: negative for weight loss, fever, night sweats  HEENT: negative for hearing loss, earache, congestion, snoring, sorethroat  CV: negative for chest pain, palpitations, edema  Resp: negative for cough, shortness of breath, wheezing  GI: negative for change in bowel habits, abdominal pain, black or bloody stools  : negative for frequency, dysuria, hematuria  MSK: negative for back pain, joint pain, muscle pain  Breast: negative for breast lumps, nipple discharge, galactorrhea  Skin :negative for itching, rash, hives  Neuro: negative for dizziness, headache, confusion, weakness  Psych: negative for anxiety, depression, change in mood  Heme/lymph: negative for bleeding, bruising, pallor    Physical Exam    Visit Vitals  /72   Pulse 87   Ht 5' 1\" (1.549 m)   Wt 116 lb (52.6 kg)   LMP 07/27/2021   BMI 21.92 kg/m²       Constitutional  · Appearance: well-nourished, well developed, alert, in no acute distress    HENT  · Head and Face: appears normal    Neck  · Inspection/Palpation: normal appearance, no masses or tenderness  · Lymph Nodes: no lymphadenopathy present  · Thyroid: gland size normal, nontender, no nodules or masses present on palpation    Chest  · Respiratory Effort: breathing unlabored  · Auscultation: normal breath sounds    Cardiovascular  · Heart:  · Auscultation: regular rate and rhythm without murmur    Breasts  · Inspection of Breasts: breasts symmetrical, no skin changes, no discharge present, nipple appearance normal, no skin retraction present  · Palpation of Breasts and Axillae: no masses present on palpation, no breast tenderness  · Axillary Lymph Nodes: no lymphadenopathy present    Gastrointestinal  · Abdominal Examination: abdomen non-tender to palpation, normal bowel sounds, no masses present  · Liver and spleen: no hepatomegaly present, spleen not palpable  · Hernias: no hernias identified    Genitourinary  · External Genitalia: normal appearance for age, no discharge present, no tenderness present, no inflammatory lesions present, no masses present, no atrophy present  · Vagina: normal vaginal vault without central or paravaginal defects, no discharge present, no inflammatory lesions present, no masses present  · Bladder: non-tender to palpation  · Urethra: appears normal  · Cervix: normal   · Uterus: normal size, shape and consistency  · Adnexa: no adnexal tenderness present, no adnexal masses present  · Perineum: perineum within normal limits, no evidence of trauma, no rashes or skin lesions present  · Anus: anus within normal limits, no hemorrhoids present  · Inguinal Lymph Nodes: no lymphadenopathy present    Skin  · General Inspection: no rash, no lesions identified    Neurologic/Psychiatric  · Mental Status:  · Orientation: grossly oriented to person, place and time  · Mood and Affect: mood normal, affect appropriate    Results for orders placed or performed during the hospital encounter of 08/16/21   [facility] MAMMO BI SCREENING INCL CAD    Narrative    STUDY: Bilateral digital screening mammogram    INDICATION:  Screening. COMPARISON: 2020-2017    BREAST COMPOSITION:  There are scattered areas of fibroglandular density. FINDINGS: Bilateral digital screening mammography was performed and is  interpreted in conjunction with a computer assisted detection (CAD) system. No  suspicious masses or calcifications are identified. There has been no  significant change. Impression    BI-RADS 1: Negative. No mammographic evidence of malignancy. RECOMMENDATIONS:  Next screening mammogram is recommended in one year. The patient will be notified of these results. Assessment & Plan:  · Routine gynecologic examination. Pap/HPV neg 6/23/20 -> q 3yrs (can do next year)  · H/o +HPV. Plan pap next year as above. · Chronic vaginal itching. Nuswab Plus with 6sp yeast, mycoplasma  · Counseled re: diet, exercise, healthy lifestyle  · Return for yearly wellness visits  · Rec annual mammogram.  Done today in our office. BR-1 today.   · Patient verbalized understanding           Orders Placed This Encounter    MYCOPLASMA GENITALIUM, AVELINO, SWAB    NUSWAB VAGINITIS PLUS (VG+) WITH CANDIDA (SIX SPECIES) ([facility])

## 2021-08-16 ENCOUNTER — OFFICE VISIT (OUTPATIENT)
Dept: OBGYN CLINIC | Age: 46
End: 2021-08-16

## 2021-08-16 VITALS
HEART RATE: 87 BPM | BODY MASS INDEX: 21.9 KG/M2 | SYSTOLIC BLOOD PRESSURE: 108 MMHG | DIASTOLIC BLOOD PRESSURE: 72 MMHG | HEIGHT: 61 IN | WEIGHT: 116 LBS

## 2021-08-16 DIAGNOSIS — N89.8 VAGINA ITCHING: ICD-10-CM

## 2021-08-16 DIAGNOSIS — Z11.3 SCREEN FOR STD (SEXUALLY TRANSMITTED DISEASE): ICD-10-CM

## 2021-08-16 DIAGNOSIS — Z01.419 ENCOUNTER FOR GYNECOLOGICAL EXAMINATION: Primary | ICD-10-CM

## 2021-08-16 PROCEDURE — 99396 PREV VISIT EST AGE 40-64: CPT | Performed by: OBSTETRICS & GYNECOLOGY

## 2021-08-20 LAB
A VAGINAE DNA VAG QL NAA+PROBE: NORMAL SCORE
BVAB2 DNA VAG QL NAA+PROBE: NORMAL SCORE
C ALBICANS DNA VAG QL NAA+PROBE: NEGATIVE
C GLABRATA DNA VAG QL NAA+PROBE: NEGATIVE
C KRUSEI DNA VAG QL NAA+PROBE: NEGATIVE
C LUSITANIAE DNA VAG QL NAA+PROBE: NEGATIVE
C TRACH DNA VAG QL NAA+PROBE: NEGATIVE
CANDIDA DNA VAG QL NAA+PROBE: NEGATIVE
M GENITALIUM DNA SPEC QL NAA+PROBE: NEGATIVE
MEGA1 DNA VAG QL NAA+PROBE: NORMAL SCORE
N GONORRHOEA DNA VAG QL NAA+PROBE: NEGATIVE
SPECIMEN STATUS REPORT, ROLRST: NORMAL
T VAGINALIS DNA VAG QL NAA+PROBE: NEGATIVE

## 2022-03-21 ENCOUNTER — TELEPHONE (OUTPATIENT)
Dept: OBGYN CLINIC | Age: 47
End: 2022-03-21

## 2022-03-21 ENCOUNTER — OFFICE VISIT (OUTPATIENT)
Dept: OBGYN CLINIC | Age: 47
End: 2022-03-21

## 2022-03-21 VITALS — DIASTOLIC BLOOD PRESSURE: 86 MMHG | SYSTOLIC BLOOD PRESSURE: 134 MMHG | WEIGHT: 120 LBS | BODY MASS INDEX: 22.67 KG/M2

## 2022-03-21 DIAGNOSIS — N93.9 ABNORMAL UTERINE BLEEDING (AUB): Primary | ICD-10-CM

## 2022-03-21 PROCEDURE — 99213 OFFICE O/P EST LOW 20 MIN: CPT | Performed by: OBSTETRICS & GYNECOLOGY

## 2022-03-21 RX ORDER — MEGESTROL ACETATE 40 MG/1
40 TABLET ORAL DAILY
Qty: 30 TABLET | Refills: 0 | Status: SHIPPED | OUTPATIENT
Start: 2022-03-21 | End: 2022-09-13

## 2022-03-21 NOTE — TELEPHONE ENCOUNTER
Patient advised of work in MD recommendations and was placed on the schedule to be seen for 10:30am ultrasound ( arrive at 10:15am ) and then appointment in afternoon at 3:20PM to arrive at 3:00pm.    Patient aware of time between appointments and verbalized understanding.

## 2022-03-21 NOTE — TELEPHONE ENCOUNTER
Call received at 9:15am      40year old patient of DM last seen in the office on 8/16/2021 for ae    Patient reports she started with bleeding ( period on 3/9/2022) and reports she has been having not normal to her heavy bleeding changing a pad every 30 minutes to hour ( not saturated)     Patient reports cramping at 6-7 sometimes up to a 10    Patient is not taking ocp and did have unprotected intercourse but states she is not pregnant ( has not taken upt)    Patient is feeling slightly dizzy but states she can drive    Patient wants to be seen in the office.     Currently ultrasound at 10 :30am? When to see Md    Please advise    Thank you

## 2022-03-21 NOTE — PROGRESS NOTES
Abnormal bleeding note      Kevin Devries is a 40 y.o. female who complains of vaginal bleeding problems. Her current method of family planning is none. She developed this problem approximately 2 weeks ago. She has had vaginal bleeding which she describes as heavy, with cramping and clots, regular cycles lasting up to a few weeks. Pad or tampon count: changes every 30 mins to 1 hours. Associated symptoms include nausea, lightheadedness, migraines. Alleviating factors: none    Aggravating factors: none      The patient is sexually active. Last Pap smear:approximate date 20 and was normal.    US today showed:  TRANSVAGINAL ULTRASOUND PERFORMED  UTERUS IS ANTEVERTED, NORMAL IN SIZE AND ECHOGENICITY. ENDOMETRIUM APPEARS THICKENED, HETEROGENEOUS AND MEASURES 16MM IN THICKNESS. A POLYP OR  FIBROID CANNOT BE RULED OUT. RIGHT OVARY APPEARS WITHIN NORMAL LIMITS. LEFT OVARY APPEARS WITHIN NORMAL LIMITS. NO FREE FLUID SEEN IN THE CDS. Her relevant past medical history:   Past Medical History:   Diagnosis Date    Abnormal Pap smear of cervix 2015; 20    ASCUS ; HPV Positive -> rec colpo, pt declined. H/o Colpo in college. Not sure of details, thinks she was tx'd with cryo; 19 Neg pap +HPV -16 -18--> colpo neg path    H/O mammogram 2020    normal mammo    Headache     Hx of mammogram 2021    BI-RADS 1: Negative. No mammographic evidence of malignancy    Routine Papanicolaou smear 2017    Negative (no hpv)     Screening for malignant neoplasm of the cervix 14; 18    Negative ; HPV Negative        Past Surgical History:   Procedure Laterality Date    HX  SECTION  2013    HX COLPOSCOPY  ~; 6/10/19    not sure of details.   ?cryo done?; 2019 neg path     Social History     Occupational History    Not on file   Tobacco Use    Smoking status: Never Smoker    Smokeless tobacco: Never Used    Tobacco comment: Does not use vapor and e-cigs   Substance and Sexual Activity    Alcohol use: Never     Alcohol/week: 0.0 standard drinks    Drug use: Never    Sexual activity: Yes     Partners: Male     Birth control/protection: None     Family History   Problem Relation Age of Onset    Osteoporosis Mother     Breast Cancer Paternal Grandmother        Allergies   Allergen Reactions    Sulfa (Sulfonamide Antibiotics) Hives     Prior to Admission medications    Medication Sig Start Date End Date Taking? Authorizing Provider   acyclovir (ZOVIRAX) 400 mg tablet TAKE 1 TABLET BY MOUTH TWICE A DAY AS NEEDED COLD SORES FOR 5 DAYS AT A TIME 5/11/20  Yes Provider, Historical   ergocalciferol (ERGOCALCIFEROL) 1,250 mcg (50,000 unit) capsule TAKE 1 CAPSULE ONCE A WEEK ORALLY 28 4/15/20  Yes Provider, Historical   fexofenadine (ALLEGRA) 180 mg tablet TAKE 1 TABLET BY MOUTH EVERY DAY AS NEEDED FOR ALLERGIES 5/5/20  Yes Provider, Historical   terconazole (TERAZOL 3) 0.8 % vaginal cream Insert 1 Applicator into vagina nightly. Patient not taking: Reported on 8/16/2021 6/15/21   Wendy Hurd MD   terconazole (TERAZOL 3) 0.8 % vaginal cream Insert 1 Applicator into vagina nightly. Patient not taking: Reported on 8/16/2021 10/22/20   Wendy Hurd MD   ethinyl estradiol-etonogestrel (NuvaRing) 0.12-0.015 mg/24 hr vaginal ring Place ring vaginally and leave in place for 3 weeks, remove for 1 week. Place new ring every 28 days.   Patient not taking: Reported on 8/16/2021 8/5/20   Wendy Hurd MD        Review of Systems - History obtained from the patient  Constitutional: negative for weight loss, fever, night sweats  HEENT: negative for hearing loss, earache, congestion, snoring, sorethroat  CV: negative for chest pain, palpitations, edema  Resp: negative for cough, shortness of breath, wheezing  Breast: negative for breast lumps, nipple discharge, galactorrhea  GI: negative for change in bowel habits, abdominal pain, black or bloody stools  : negative for frequency, dysuria, hematuria  MSK: negative for back pain, joint pain, muscle pain  Skin: negative for itching, rash, hives  Neuro: negative for dizziness, headache, confusion, weakness  Psych: negative for anxiety, depression, change in mood  Heme/lymph: negative for bleeding, bruising, pallor      Objective:    Visit Vitals  /86   Wt 120 lb (54.4 kg)   LMP 03/08/2022   BMI 22.67 kg/m²          PHYSICAL EXAMINATION    Constitutional  · Appearance: well-nourished, well developed, alert, in no acute distress    HENT  · Head and Face: appears normal    Neck  · Inspection/Palpation: normal appearance, no masses or tenderness  · Lymph Nodes: no lymphadenopathy present  · Thyroid: gland size normal, nontender, no nodules or masses present on palpation  ·   Breasts  · Inspection of Breasts: breasts symmetrical, no skin changes, no discharge present, nipple appearance normal, no skin retraction present  · Palpation of Breasts and Axillae: no masses present on palpation, no breast tenderness  · Axillary Lymph Nodes: no lymphadenopathy present    Gastrointestinal  · Abdominal Examination: abdomen non-tender to palpation, normal bowel sounds, no masses present  · Liver and spleen: no hepatomegaly present, spleen not palpable  · Hernias: no hernias identified    Genitourinary  · External Genitalia: normal appearance for age, no discharge present, no tenderness present, no inflammatory lesions present, no masses present, no atrophy present  · Vagina: normal vaginal vault without central or paravaginal defects, small to moderate bloosy discharge present, no inflammatory lesions present, no masses present  · Bladder: non-tender to palpation  · Urethra: appears normal  · Cervix: normal   · Uterus: normal size, shape and consistency  · Adnexa: no adnexal tenderness present, no adnexal masses present  · Perineum: perineum within normal limits, no evidence of trauma, no rashes or skin lesions present  · Anus: anus within normal limits, no hemorrhoids present  · Inguinal Lymph Nodes: no lymphadenopathy present    Skin  · General Inspection: no rash, no lesions identified    Neurologic/Psychiatric  · Mental Status:  · Orientation: grossly oriented to person, place and time  · Mood and Affect: mood normal, affect appropriate    Assessment:   Abnormal uterine bleeding    Plan:   Megace to stop bleeding and then RTO for [facility] and biopsy; advised to pretreat with OTC NSAID  Check HCG and CBC        Instructions given to pt.

## 2022-03-21 NOTE — PATIENT INSTRUCTIONS
Abnormal Uterine Bleeding: Care Instructions  Overview     Abnormal uterine bleeding is irregular bleeding from the uterus. It may be bleeding that is heavier, lighter, or lasts longer than your usual period. Or it may be bleeding that doesn't occur at your regular time. Sometimes it is caused by changes in hormone levels. It can also be caused by growths in the uterus, such as fibroids or polyps. Sometimes a cause cannot be found. You may have bleeding when you are not expecting your period. Your doctor may suggest a pregnancy test.  Follow-up care is a key part of your treatment and safety. Be sure to make and go to all appointments, and call your doctor if you are having problems. It's also a good idea to know your test results and keep a list of the medicines you take. How can you care for yourself at home? · Be safe with medicines. Take pain medicines exactly as directed. ? If the doctor gave you a prescription medicine for pain, take it as prescribed. ? If you are not taking a prescription pain medicine, ask your doctor if you can take an over-the-counter medicine. · You may be low in iron because of blood loss. Eat a balanced diet that is high in iron and vitamin C. Foods rich in iron include red meat, shellfish, eggs, beans, and leafy green vegetables. Talk to your doctor about whether you need to take iron pills or a multivitamin. When should you call for help? Call 911 anytime you think you may need emergency care. For example, call if:    · You passed out (lost consciousness). Call your doctor now or seek immediate medical care if:    · You have new or worse belly or pelvic pain.     · You have severe vaginal bleeding.     · You feel dizzy or lightheaded, or you feel like you may faint. Watch closely for changes in your health, and be sure to contact your doctor if:    · You think you may be pregnant.     · Your bleeding gets worse.     · You do not get better as expected.    Where can you learn more? Go to http://www.gray.com/  Enter I327 in the search box to learn more about \"Abnormal Uterine Bleeding: Care Instructions. \"  Current as of: November 22, 2021               Content Version: 13.2  © 0907-4923 [employer]. Care instructions adapted under license by [employer] (which disclaims liability or warranty for this information). If you have questions about a medical condition or this instruction, always ask your healthcare professional. [place] [address] any warranty or liability for your use of this information.

## 2022-03-22 LAB
BASOPHILS # BLD: 0.1 K/UL (ref 0–0.1)
BASOPHILS NFR BLD: 1 % (ref 0–1)
DIFFERENTIAL METHOD BLD: ABNORMAL
EOSINOPHIL # BLD: 0.2 K/UL (ref 0–0.4)
EOSINOPHIL NFR BLD: 2 % (ref 0–7)
ERYTHROCYTE [DISTWIDTH] IN BLOOD BY AUTOMATED COUNT: 12.6 % (ref 11.5–14.5)
HCG SERPL-ACNC: <1 MIU/ML (ref 0–6)
HCT VFR BLD AUTO: 42.1 % (ref 35–47)
HGB BLD-MCNC: 13.2 G/DL (ref 11.5–16)
IMM GRANULOCYTES # BLD AUTO: 0 K/UL (ref 0–0.04)
IMM GRANULOCYTES NFR BLD AUTO: 0 % (ref 0–0.5)
LYMPHOCYTES # BLD: 2.7 K/UL (ref 0.8–3.5)
LYMPHOCYTES NFR BLD: 24 % (ref 12–49)
MCH RBC QN AUTO: 30.8 PG (ref 26–34)
MCHC RBC AUTO-ENTMCNC: 31.4 G/DL (ref 30–36.5)
MCV RBC AUTO: 98.4 FL (ref 80–99)
MONOCYTES # BLD: 0.7 K/UL (ref 0–1)
MONOCYTES NFR BLD: 6 % (ref 5–13)
NEUTS SEG # BLD: 7.4 K/UL (ref 1.8–8)
NEUTS SEG NFR BLD: 67 % (ref 32–75)
NRBC # BLD: 0 K/UL (ref 0–0.01)
NRBC BLD-RTO: 0 PER 100 WBC
PLATELET # BLD AUTO: 312 K/UL (ref 150–400)
PMV BLD AUTO: 10.5 FL (ref 8.9–12.9)
RBC # BLD AUTO: 4.28 M/UL (ref 3.8–5.2)
WBC # BLD AUTO: 11.1 K/UL (ref 3.6–11)

## 2022-03-22 NOTE — TELEPHONE ENCOUNTER
Call received at 11:30am      Patient calling back with various questions about the medication prescribed yesterday,the procedures she is scheduled for. Patient would like to discuss with her regular doctor the next steps and options. Patient was placed on the schedule to be seen on 3/25/2022 at 10:40am to arrive at 10:20am      Patient verbalized understanding. Patient was advised of  MD reviewed labs and verbalized understanding.   Released Not seen      You are not pregnant and you are not anemic   Written by Ken Bianchi MD on 3/22/2022  9:11 AM EDT

## 2022-03-24 NOTE — PROGRESS NOTES
Problem Visit-Complete    Chief Complaint   Vaginal Bleeding      HPI  Kevin Devries is a 40 y.o. female who presents for the evaluation of vaginal bleeding that started 3/9 and didn't stop until 3/24/22 with the help of megace. Patient's last menstrual period was 2022. She saw Dr Koby Hernandez on 3/22/24 and was given megace. Had small amt bleeding yesterday, none today. The patient denies any other irregular periods. Period started on time, did not miss period month before. First 2-3 days, \"weird blood\", clumpy, brown, not heavy (will often have this just for the first day). 4th day, no bleeding. Heavy bleeding started on CD#5. Heavy bleeding lasted ~4 days, then slowed down. Feels \"heavy\"    Got cortisone shot a week or two before bleeding started. Has inflammation from COVID vaccine. US (3/21/22):  TRANSVAGINAL ULTRASOUND PERFORMED  UTERUS IS ANTEVERTED, NORMAL IN SIZE AND ECHOGENICITY. ENDOMETRIUM APPEARS THICKENED, HETEROGENEOUS AND MEASURES 16MM IN THICKNESS. A POLYP OR  FIBROID CANNOT BE RULED OUT. RIGHT OVARY APPEARS WITHIN NORMAL LIMITS. LEFT OVARY APPEARS WITHIN NORMAL LIMITS. NO FREE FLUID SEEN IN THE CDS. Lab Results   Component Value Date/Time    HGB 13.2 2022 04:21 PM     Quant <1      Past Medical History:   Diagnosis Date    Abnormal Pap smear of cervix 2015; 20    ASCUS ; HPV Positive -> rec colpo, pt declined. H/o Colpo in college. Not sure of details, thinks she was tx'd with cryo; 19 Neg pap +HPV -16 -18--> colpo neg path    H/O mammogram 2020    normal mammo    Headache     Hx of mammogram 2021    BI-RADS 1: Negative.  No mammographic evidence of malignancy    Routine Papanicolaou smear 2017    Negative (no hpv)     Screening for malignant neoplasm of the cervix 14; 18    Negative ; HPV Negative     Past Surgical History:   Procedure Laterality Date    HX  SECTION  2013    HX COLPOSCOPY  ~; 6/10/19    not sure of details. ?cryo done?; 6/2019 neg path     Social History     Occupational History    Not on file   Tobacco Use    Smoking status: Never Smoker    Smokeless tobacco: Never Used    Tobacco comment: Does not use vapor and e-cigs   Substance and Sexual Activity    Alcohol use: Never     Alcohol/week: 0.0 standard drinks    Drug use: Never    Sexual activity: Yes     Partners: Male     Birth control/protection: None     Family History   Problem Relation Age of Onset    Osteoporosis Mother     Breast Cancer Paternal Grandmother        Allergies   Allergen Reactions    Sulfa (Sulfonamide Antibiotics) Hives     Prior to Admission medications    Medication Sig Start Date End Date Taking? Authorizing Provider   megestroL (MEGACE) 40 mg tablet Take 1 Tablet by mouth daily.  3/21/22  Yes Jerrell Beach MD   acyclovir (ZOVIRAX) 400 mg tablet TAKE 1 TABLET BY MOUTH TWICE A DAY AS NEEDED COLD SORES FOR 5 DAYS AT A TIME 5/11/20  Yes Provider, Historical   ergocalciferol (ERGOCALCIFEROL) 1,250 mcg (50,000 unit) capsule TAKE 1 CAPSULE ONCE A WEEK ORALLY 28 4/15/20  Yes Provider, Historical   fexofenadine (ALLEGRA) 180 mg tablet TAKE 1 TABLET BY MOUTH EVERY DAY AS NEEDED FOR ALLERGIES 5/5/20  Yes Provider, Historical        Review of Systems: History obtained from the patient  Constitutional: negative for weight loss, fever, night sweats  HEENT: negative for hearing loss, earache, congestion, snoring, sorethroat  CV: negative for chest pain, palpitations, edema  Resp: negative for cough, shortness of breath, wheezing  Breast: negative for breast lumps, nipple discharge, galactorrhea  GI: negative for change in bowel habits, abdominal pain, black or bloody stools  : negative for frequency, dysuria, hematuria, vaginal discharge  MSK: negative for back pain, joint pain, muscle pain  Skin: negative for itching, rash, hives  Neuro: negative for dizziness, headache, confusion, weakness  Psych: negative for anxiety, depression, change in mood  Heme/lymph: negative for bleeding, bruising, pallor    Objective:  Visit Vitals  /73   Pulse (!) 113   Wt 120 lb (54.4 kg)   LMP 03/08/2022   BMI 22.67 kg/m²       Physical Exam:     Constitutional  · Appearance: well-nourished, well developed, alert, in no acute distress    HENT  · Head and Face: appears normal    Gastrointestinal  · Abdominal Examination: abdomen non-tender to palpation, no masses present  · Liver and spleen: no hepatomegaly present, spleen not palpable  · Hernias: no hernias identified    Genitourinary  · External Genitalia: normal appearance for age, no discharge present, no tenderness present, no inflammatory lesions present, no masses present, no atrophy present  · Vagina: normal vaginal vault without central or paravaginal defects, no discharge present, no inflammatory lesions present, no masses present  · Bladder: non-tender to palpation  · Urethra: appears normal  · Cervix: normal   · Uterus: normal size, shape and consistency  · Adnexa: no adnexal tenderness present, no adnexal masses present  · Perineum: perineum within normal limits, no evidence of trauma, no rashes or skin lesions present  · Anus: anus within normal limits, no hemorrhoids present  · Inguinal Lymph Nodes: no lymphadenopathy present    Skin  · General Inspection: no rash, no lesions identified    Neurologic/Psychiatric  · Mental Status:  · Orientation: grossly oriented to person, place and time  · Mood and Affect: mood normal, affect appropriate    Assessment:  AUB  Good response to megace  ?d/t recent cortisone injection? Plan:   Continue megace for now. Can stop ~ 4/3-5. This should allow for withdrawal bleed to coincide with when she is due for her cycle. Plan SIS the following week. Rec NSAIDs 2hrs prior. Will check TSH today.       Orders Placed This Encounter    TSH 3RD GENERATION     Standing Status:   Future     Number of Occurrences:   1     Standing Expiration Date: 3/25/2023

## 2022-03-25 ENCOUNTER — OFFICE VISIT (OUTPATIENT)
Dept: OBGYN CLINIC | Age: 47
End: 2022-03-25
Payer: COMMERCIAL

## 2022-03-25 VITALS
DIASTOLIC BLOOD PRESSURE: 73 MMHG | SYSTOLIC BLOOD PRESSURE: 113 MMHG | WEIGHT: 120 LBS | BODY MASS INDEX: 22.67 KG/M2 | HEART RATE: 113 BPM

## 2022-03-25 DIAGNOSIS — N93.9 ABNORMAL UTERINE BLEEDING (AUB): Primary | ICD-10-CM

## 2022-03-25 PROCEDURE — 99214 OFFICE O/P EST MOD 30 MIN: CPT | Performed by: OBSTETRICS & GYNECOLOGY

## 2022-03-26 LAB — TSH SERPL DL<=0.05 MIU/L-ACNC: 1.71 UIU/ML (ref 0.36–3.74)

## 2022-04-07 ENCOUNTER — TELEPHONE (OUTPATIENT)
Dept: OBGYN CLINIC | Age: 47
End: 2022-04-07

## 2022-04-07 NOTE — TELEPHONE ENCOUNTER
Call received at 305PM    40year old last seen in the office on 8/16/2021 for ae and had recent problem visit    Patient calling to say that she took the Megace as prescribed for a few weeks stopped due to side effects and has now has been bleeding for a week    Patient reports her last period was 3/9/2022.     Patient has the SIS on 4/14/2022 and is wondering when she needs to start back on the megace to be able to have the procedure done      Please advise    Thank you

## 2022-04-14 ENCOUNTER — OFFICE VISIT (OUTPATIENT)
Dept: OBGYN CLINIC | Age: 47
End: 2022-04-14

## 2022-04-14 DIAGNOSIS — N93.9 ABNORMAL UTERINE BLEEDING (AUB): Primary | ICD-10-CM

## 2022-04-14 DIAGNOSIS — R93.5 ABNORMAL ENDOMETRIAL ULTRASOUND: ICD-10-CM

## 2022-04-14 DIAGNOSIS — D21.9 FIBROIDS: ICD-10-CM

## 2022-07-06 ENCOUNTER — TELEPHONE (OUTPATIENT)
Dept: OBGYN CLINIC | Age: 47
End: 2022-07-06

## 2022-07-06 NOTE — TELEPHONE ENCOUNTER
40year old patient last seen in the office on 4/14/2022    Patient is requesting to have ultrasound with her visit to discuss next steps    Patient was rescheduled including ultrasound for 7/21/2022    ?  Ok to have ultrasound with appointment    Please advise    Thank you

## 2022-07-20 NOTE — PROGRESS NOTES
Problem Visit-Complete    Chief Complaint   Ultrasound      HPI  Ortiz Mcconnell is a 56 y.o. female who presents for the evaluation of AUB. Patient's last menstrual period was 2022. Pt seen previously for AUB. SIS done in 22. Able to distend the endometrium, but could not maintain sustained distension. She was very uncomfortable with the procedure. Did take Megace. Bleeding has improved. Cycle still heavy, but seem more regular. Today's ultrasound showed:  TRANSVAGINAL ULTRASOUND PERFORMED  UTERUS IS ANTEVERTED, NORMAL IN SIZE AND HETEROGENEOUS IN ECHOGENICITY. A POSTERIOR INTRAMURAL FIBROID IS SEEN AND MEASURED.  ENDOMETRIUM APPEARS TO MEASURES 7-8MM IN THICKNESS. THE BORDERS ARE DIFFICULT TO VISUALIZE. RIGHT OVARY APPEARS TO HAVE A COMPLEX CYST THAT MEASURES 18 X 12 X 17MM. LEFT OVARY APPEARS TO HAVE A COMPLEX CYST THAT MEASURES 13 X 12 X 16MM. NO FREE FLUID SEEN IN THE CDS. The patient reports that her bleeding stopped with the Megace. This last period 22 was heavy with clots, but stopped on its own 7-8 days later. Past Medical History:   Diagnosis Date    Abnormal Pap smear of cervix 2015; 19    ASCUS ; HPV Positive -> rec colpo, pt declined. H/o Colpo in college. Not sure of details, thinks she was tx'd with cryo; 19 Neg pap +HPV -16 -18--> colpo neg path    H/O mammogram 2020    normal mammo    Headache     Hx of mammogram 2021    BI-RADS 1: Negative. No mammographic evidence of malignancy    Routine Papanicolaou smear 2017    Negative (no hpv)     Screening for malignant neoplasm of the cervix 14; 18    Negative ; HPV Negative     Past Surgical History:   Procedure Laterality Date    HX  SECTION  2013    HX COLPOSCOPY  ~; 6/10/19    not sure of details.   ?cryo done?; 2019 neg path     Social History     Occupational History    Not on file   Tobacco Use    Smoking status: Never    Smokeless tobacco: Never    Tobacco comments:     Does not use vapor and e-cigs   Substance and Sexual Activity    Alcohol use: Never     Alcohol/week: 0.0 standard drinks    Drug use: Never    Sexual activity: Yes     Partners: Male     Birth control/protection: None     Family History   Problem Relation Age of Onset    Osteoporosis Mother     Breast Cancer Paternal Grandmother        Allergies   Allergen Reactions    Sulfa (Sulfonamide Antibiotics) Hives     Prior to Admission medications    Medication Sig Start Date End Date Taking? Authorizing Provider   acyclovir (ZOVIRAX) 400 mg tablet TAKE 1 TABLET BY MOUTH TWICE A DAY AS NEEDED COLD SORES FOR 5 DAYS AT A TIME 5/11/20  Yes Provider, Historical   ergocalciferol (ERGOCALCIFEROL) 1,250 mcg (50,000 unit) capsule TAKE 1 CAPSULE ONCE A WEEK ORALLY 28 4/15/20  Yes Provider, Historical   fexofenadine (ALLEGRA) 180 mg tablet TAKE 1 TABLET BY MOUTH EVERY DAY AS NEEDED FOR ALLERGIES 5/5/20  Yes Provider, Historical   megestroL (MEGACE) 40 mg tablet Take 1 Tablet by mouth daily.   Patient not taking: Reported on 7/21/2022 3/21/22   Jenny Choi MD        Review of Systems: History obtained from the patient  Constitutional: negative for weight loss, fever, night sweats  HEENT: negative for hearing loss, earache, congestion, snoring, sorethroat  CV: negative for chest pain, palpitations, edema  Resp: negative for cough, shortness of breath, wheezing  Breast: negative for breast lumps, nipple discharge, galactorrhea  GI: negative for change in bowel habits, abdominal pain, black or bloody stools  : negative for frequency, dysuria, hematuria, vaginal discharge  MSK: negative for back pain, joint pain, muscle pain  Skin: negative for itching, rash, hives  Neuro: negative for dizziness, headache, confusion, weakness  Psych: negative for anxiety, depression, change in mood  Heme/lymph: negative for bleeding, bruising, pallor    Objective:  Visit Vitals  /80   Pulse 80   Wt 123 lb (55.8 kg)   LMP 07/07/2022   BMI 23.24 kg/m²       Physical Exam:     Constitutional  Appearance: well-nourished, well developed, alert, in no acute distress    HENT  Head and Face: appears normal      Skin  General Inspection: no rash, no lesions identified    Neurologic/Psychiatric  Mental Status:  Orientation: grossly oriented to person, place and time  Mood and Affect: mood normal, affect appropriate    Assessment:  H/o irregular cycles  Prev SIS suspicious for possible polyp  Endometrium appears thinner on US today  Bilat small follciular cysts (<2cm) ov cysts, likely physiologic given small size and not seen on previous US    Plan:   Reviewed above with pt. She is not interested in surgery (hysteroscopy) at this time. Asking about being \"put under\" in office to rpt SIS. Discussed we cannot do that in office. If she needed to be put to sleep , then would do that in OR and proceed with hysteroscopy. Could consider cyclic progestin with provera or aygestin  She would like to monitor cycles for now. Rpt US ~3 months to re-eval follicular cysts.

## 2022-07-21 ENCOUNTER — OFFICE VISIT (OUTPATIENT)
Dept: OBGYN CLINIC | Age: 47
End: 2022-07-21

## 2022-07-21 VITALS
WEIGHT: 123 LBS | DIASTOLIC BLOOD PRESSURE: 80 MMHG | SYSTOLIC BLOOD PRESSURE: 113 MMHG | BODY MASS INDEX: 23.24 KG/M2 | HEART RATE: 80 BPM

## 2022-07-21 DIAGNOSIS — N83.201 CYSTS OF BOTH OVARIES: ICD-10-CM

## 2022-07-21 DIAGNOSIS — N83.202 CYSTS OF BOTH OVARIES: ICD-10-CM

## 2022-07-21 DIAGNOSIS — Z87.42 HISTORY OF IRREGULAR MENSTRUAL BLEEDING: Primary | ICD-10-CM

## 2022-07-21 PROCEDURE — 99212 OFFICE O/P EST SF 10 MIN: CPT | Performed by: OBSTETRICS & GYNECOLOGY

## 2022-09-12 NOTE — PROGRESS NOTES
Annual exam ages 40-58    Jeremy Sullivan is a ,  56 y.o. female OTHER Patient's last menstrual period was 2022., who presents for her annual checkup. AE=21    Since her last annual GYN exam about one year ago,  she has the following changes in her health history:   - none    ADDITIONAL CONCERNS:  She is having significant cramping and bloating before, during and after her periods . Also c/o intermittent vaginal discharge. Had clumpy white d/c a few days ago when she was SA. Bloating sx started around the same time the AUB started. Timing of cycles have gotten back on track, about every 3.5wks. Still having bloating \"\". Has not seen PCP or GI.    AUB. Seen in March for AUB. Period started 3/9/22 as expected. Period started on time, did not miss period month before. Did get cortisone shot 1-2wks before bleeding started. First 2-3 days, \"weird blood\", clumpy, brown, not heavy (will often have this just for the first day). 4th day, no bleeding. Heavy bleeding started on CD#5. Heavy bleeding lasted ~4 days, then slowed down. Feels \"heavy\"  Saw Dr. Oliva Beckwith 3/21/22 given Megace. Had good response to this. US done at that visit showed thickened endometrium, ?polyp. Returned 22 for SIS. Very uncomfortable with procedure, unable to maintain good distension. Anterior wall appears irregular, ?sessile polyp; uterine fibroid noted, ?some submucosal involvement (<50%). Discussed HS/D&C/polypectomy, poss myomectomy with Myosure. Procedure was scheduled, then canceled by pt. Opted to continue to monitor cycles as they seemed to be improving. With regard to the Gardasil vaccine, she is older than the age for which it is FDA approved. Menstrual status:    Cycles have resumed regular timing. Bleeding still variable. Some months 5-6 days, other months 1-2 days of bleeding then a few days of dark clumpy blood. She has dysmenorrhea.     She has premenstrual symptoms. Contraception:    The current method of family planning is none. Sexual history:     reports being sexually active and has had partner(s) who are male. She reports using the following method of birth control/protection: None. Pap and Mammogram History:    Her most recent Pap smear was normal,  HPV was neg, obtained 2020. She does have a history of abnormal pap smears. .  H/o colpo in college, not sure of results. Thinks may have been tx'd with cryo. Pap/HPV (2014) N/N  Pap (9/14/15) ASCUS ; HPV Positive [done at pt's request]  -> rec colpo, pt declined, requested rpt pap  Pap/HPV (16) N/N  Pap (3/9/17) neg (HPV not tested)  Pap/HPV (18) N/N  Pap (19) nl cells/+HPV, neg 16/18 -> colpo  colpo (6/10/19) ECC neg, cvx @ 2:00 neg  Pap/HPV (20) N/N -> rpt 3yrs per [facility]      The patient had her mammogram today in our office. Past Medical History:   Diagnosis Date    Abnormal Pap smear of cervix 2015; 20    ASCUS ; HPV Positive -> rec colpo, pt declined. H/o Colpo in college. Not sure of details, thinks she was tx'd with cryo; 19 Neg pap +HPV -16 -18--> colpo neg path    H/O mammogram 2020    normal mammo    Headache     Hx of mammogram 2021    BI-RADS 1: Negative. No mammographic evidence of malignancy    Routine Papanicolaou smear 2017    Negative (no hpv)     Screening for malignant neoplasm of the cervix 14; 18    Negative ; HPV Negative     Past Surgical History:   Procedure Laterality Date    HX  SECTION  2013    HX COLPOSCOPY  ~; 6/10/19    not sure of details.   ?cryo done?; 2019 neg path     OB History    Para Term  AB Living   1 1 1     1   SAB IAB Ectopic Molar Multiple Live Births             1      # Outcome Date GA Lbr Gagandeep/2nd Weight Sex Delivery Anes PTL Lv   1 Term 02/10/13 40w0d 23:00 5 lb 8 oz (2.495 kg) M  CL Spinal N TEGAN      Birth Comments: Cord wrapped around baby's nexk - emerg. c/s       Current Outpatient Medications   Medication Sig Dispense Refill    acyclovir (ZOVIRAX) 400 mg tablet TAKE 1 TABLET BY MOUTH TWICE A DAY AS NEEDED COLD SORES FOR 5 DAYS AT A TIME      ergocalciferol (ERGOCALCIFEROL) 1,250 mcg (50,000 unit) capsule TAKE 1 CAPSULE ONCE A WEEK ORALLY 28      fexofenadine (ALLEGRA) 180 mg tablet TAKE 1 TABLET BY MOUTH EVERY DAY AS NEEDED FOR ALLERGIES       Allergies: Sulfa (sulfonamide antibiotics)   Social History     Socioeconomic History    Marital status: SINGLE     Spouse name: Not on file    Number of children: Not on file    Years of education: Not on file    Highest education level: Not on file   Occupational History    Not on file   Tobacco Use    Smoking status: Never    Smokeless tobacco: Never    Tobacco comments:     Does not use vapor and e-cigs   Substance and Sexual Activity    Alcohol use: Never     Alcohol/week: 0.0 standard drinks    Drug use: Never    Sexual activity: Yes     Partners: Male     Birth control/protection: None   Other Topics Concern    Not on file   Social History Narrative    Not on file     Social Determinants of Health     Financial Resource Strain: Not on file   Food Insecurity: Not on file   Transportation Needs: Not on file   Physical Activity: Not on file   Stress: Not on file   Social Connections: Not on file   Intimate Partner Violence: Not on file   Housing Stability: Not on file     Tobacco History:  reports that she has never smoked. She has never used smokeless tobacco.  Alcohol Abuse:  reports no history of alcohol use. Drug Abuse:  reports no history of drug use.     Patient Active Problem List   Diagnosis Code    Abnormal Pap smear of cervix R87.619    Abnormal uterine bleeding (AUB) N93.9     Family History   Problem Relation Age of Onset    Osteoporosis Mother     Breast Cancer Paternal Grandmother        Review of Systems - History obtained from the patient  Constitutional: negative for weight loss, fever, night sweats  HEENT: negative for hearing loss, earache, congestion, snoring, sorethroat  CV: negative for chest pain, palpitations, edema  Resp: negative for cough, shortness of breath, wheezing  GI: negative for change in bowel habits, abdominal pain, black or bloody stools  : negative for frequency, dysuria, hematuria, vaginal discharge  MSK: negative for back pain, joint pain, muscle pain  Breast: negative for breast lumps, nipple discharge, galactorrhea  Skin :negative for itching, rash, hives  Neuro: negative for dizziness, headache, confusion, weakness  Psych: negative for anxiety, depression, change in mood  Heme/lymph: negative for bleeding, bruising, pallor    Physical Exam    Visit Vitals  /82   Pulse 96   Ht 5' 1\" (1.549 m)   Wt 123 lb (55.8 kg)   LMP 08/28/2022   BMI 23.24 kg/m²       Constitutional  Appearance: well-nourished, well developed, alert, in no acute distress    HENT  Head and Face: appears normal    Neck  Inspection/Palpation: normal appearance, no masses or tenderness  Lymph Nodes: no lymphadenopathy present  Thyroid: gland size normal, nontender, no nodules or masses present on palpation    Chest  Respiratory Effort: breathing unlabored  Auscultation: normal breath sounds    Cardiovascular  Heart:   Auscultation: regular rate and rhythm without murmur    Breasts  Inspection of Breasts: breasts symmetrical, no skin changes, no discharge present, nipple appearance normal, no skin retraction present  Palpation of Breasts and Axillae: no masses present on palpation, no breast tenderness  Axillary Lymph Nodes: no lymphadenopathy present    Gastrointestinal  Abdominal Examination: abdomen non-tender to palpation, normal bowel sounds, no masses present  Liver and spleen: no hepatomegaly present, spleen not palpable  Hernias: no hernias identified    Genitourinary  External Genitalia: normal appearance for age, no discharge present, no tenderness present, no inflammatory lesions present, no masses present, no atrophy present  Vagina: normal vaginal vault without central or paravaginal defects, no discharge present, no inflammatory lesions present, no masses present  Bladder: minimally tender to palpation  Urethra: appears normal  Cervix: normal with scant discharge at os  Uterus: normal size, shape and consistency  Adnexa: no adnexal tenderness present, no adnexal masses present  Perineum: perineum within normal limits, no evidence of trauma, no rashes or skin lesions present  Anus: anus within normal limits, no hemorrhoids present  Inguinal Lymph Nodes: no lymphadenopathy present    Skin  General Inspection: no rash, no lesions identified    Neurologic/Psychiatric  Mental Status:  Orientation: grossly oriented to person, place and time  Mood and Affect: mood normal, affect appropriate    Results for orders placed or performed during the hospital encounter of 09/13/22   MARVA 3D PRISCILLA W MAMMO BI SCREENING INCL CAD    Narrative    STUDY: Bilateral digital screening mammogram with 3-D tomosynthesis    INDICATION:  Screening. COMPARISON: Priors dating back to 2018    BREAST COMPOSITION: The breasts are heterogeneously dense, which may   obscure  small masses. FINDINGS: Bilateral digital screening mammography was performed and is  interpreted in conjunction with a computer assisted detection (CAD)   system. Additionally, tomosynthesis of both breasts in the CC and MLO projections   was  performed. No suspicious masses or calcifications are identified. There   has been  no significant change. Impression    BI-RADS 1: Negative. No mammographic evidence of malignancy. RECOMMENDATIONS:  Next screening mammogram is recommended in one year. The patient will be notified of these results. Assessment & Plan:  Routine gynecologic examination. Pap/HPV today  H/o +HPV, pap/HPV as above  Seen earlier this year for AUB.   Cycles have returned to regular timing, flow still variable. Had ?polyp on prev SIS, pt canceled HS. Could consider provera x10d to see if this helps to reset lining. Bloating. Chronic. Adv most likely GI. Suggest she try Mg 250-500mg; food diary to assess fiber intake with goal of 25-36gm/d. F/up with PCP and/or GI if sx persist.  C/o intermittent vaginal discharge, none seen on exam today. Nuswab plus  Previous US showed small ovarian cysts, most likely physiologic. Has f/up US scheduled 10/25  Counseled re: diet, exercise, healthy lifestyle  Return for yearly wellness visits  Rec annual mammogram.  Done today in our office. BR-1  Patient verbalized understanding           Orders Placed This Encounter    NUSWAB VAGINITIS PLUS ([facility])    PAP IG, APTIMA HPV AND RFX 73/75,14 ([id number])     Order Specific Question:   Pap Source? Answer:   Cervical and Endocervical     Order Specific Question:   Total Hysterectomy? Answer:   No     Order Specific Question:   Supracervical Hysterectomy? Answer:   No     Order Specific Question:   Post Menopausal?     Answer:   No     Order Specific Question:   Hormone Therapy? Answer:   No     Order Specific Question:   IUD? Answer:   No     Order Specific Question:   Abnormal Bleeding? Answer:   Yes     Order Specific Question:   Pregnant     Answer:   No     Order Specific Question:   Post Partum? Answer:    No

## 2022-09-13 ENCOUNTER — OFFICE VISIT (OUTPATIENT)
Dept: OBGYN CLINIC | Age: 47
End: 2022-09-13

## 2022-09-13 VITALS
HEART RATE: 96 BPM | BODY MASS INDEX: 23.22 KG/M2 | SYSTOLIC BLOOD PRESSURE: 125 MMHG | HEIGHT: 61 IN | WEIGHT: 123 LBS | DIASTOLIC BLOOD PRESSURE: 82 MMHG

## 2022-09-13 DIAGNOSIS — R14.0 BLOATING: ICD-10-CM

## 2022-09-13 DIAGNOSIS — N93.9 ABNORMAL UTERINE BLEEDING (AUB): ICD-10-CM

## 2022-09-13 DIAGNOSIS — Z12.4 SCREENING FOR CERVICAL CANCER: ICD-10-CM

## 2022-09-13 DIAGNOSIS — N89.8 VAGINAL DISCHARGE: ICD-10-CM

## 2022-09-13 DIAGNOSIS — Z01.419 ENCOUNTER FOR WELL WOMAN EXAM: Primary | ICD-10-CM

## 2022-09-13 DIAGNOSIS — Z11.3 SCREEN FOR STD (SEXUALLY TRANSMITTED DISEASE): ICD-10-CM

## 2022-09-13 PROCEDURE — 99396 PREV VISIT EST AGE 40-64: CPT | Performed by: OBSTETRICS & GYNECOLOGY

## 2022-09-15 LAB
A VAGINAE DNA VAG QL NAA+PROBE: NORMAL SCORE
BVAB2 DNA VAG QL NAA+PROBE: NORMAL SCORE
C ALBICANS DNA VAG QL NAA+PROBE: NEGATIVE
C GLABRATA DNA VAG QL NAA+PROBE: NEGATIVE
C TRACH DNA VAG QL NAA+PROBE: NEGATIVE
MEGA1 DNA VAG QL NAA+PROBE: NORMAL SCORE
N GONORRHOEA DNA VAG QL NAA+PROBE: NEGATIVE
SPECIMEN STATUS REPORT, ROLRST: NORMAL
T VAGINALIS DNA VAG QL NAA+PROBE: NEGATIVE

## 2022-09-18 LAB
CYTOLOGIST CVX/VAG CYTO: NORMAL
CYTOLOGY CVX/VAG DOC CYTO: NORMAL
CYTOLOGY CVX/VAG DOC THIN PREP: NORMAL
CYTOLOGY HISTORY:: NORMAL
DX ICD CODE: NORMAL
HPV I/H RISK 4 DNA CVX QL PROBE+SIG AMP: NEGATIVE
Lab: NORMAL
OTHER STN SPEC: NORMAL
STAT OF ADQ CVX/VAG CYTO-IMP: NORMAL

## 2022-11-08 ENCOUNTER — TELEPHONE (OUTPATIENT)
Dept: OBGYN CLINIC | Age: 47
End: 2022-11-08

## 2022-11-08 NOTE — TELEPHONE ENCOUNTER
Pt called name and  verified. Pt states she started bleeding yesterday not her period but its heavy and is concerned whether she can still do her appointment tomorrow, her lmp 10-.       Please advise thank you

## 2022-11-09 NOTE — PROGRESS NOTES
Problem Visit-Limited    Chief Complaint   Ultrasound and Irregular Menses      HPI  Susan Lewis is a 56 y.o. female who presents for the evaluation of AUB. Patient's last menstrual period was 11/04/2022. Period prior was 10/22    Presented in March with complaints of abnormal uterine bleeding. Saw Dr. Javid Barger as I was out of the office. Was changing her pads/tampons every 30 to 60 minutes. Her hemoglobin was normal at 13.2. She was given a prescription for Megace advised to return to the office for a sonohysterogram and biopsy. Note, she states the irregular bleeding started a week or 2 after she got a cortisone shot due to inflammation from the COVID-vaccine. The patient denies any other irregular periods. Period started on time, did not miss period month before. Saw me for a follow-up visit on 3/25/2022. Reported first 2-3 days, \"weird blood\", clumpy, brown, not heavy (will often have this just for the first day). 4th day, no bleeding. Heavy bleeding started on CD#5. Heavy bleeding lasted ~4 days, then slowed down. Feels \"heavy\"  Ultrasound done at that visit thickened heterogenous endometrium, could not rule out polyp or fibroid. TSH drawn at that visit was normal at 1.71. She was advised to continue the Megace then stop taking it early April to allow for a withdrawal bleed, followed by SIS. She returned to the office on 4/14/2022 for the SIS and possible biopsy. Initially there was some difficulty ring the Michial Titus catheter. A flexible os finder was used for placement. The study was limited the inability to maintain good distention. She was very uncomfortable with the procedure. The anterior wall appeared irregular with a possible sessile polyp. There also appeared to be a uterine fibroid noted some possible submucosal involvement. Discussed hysteroscopy/MyoSure resection of the possible polyp and/or fibroid. She has been very hesitant to have any surgery.   She contacted the office on 2022 to cancel her surgery scheduled for 6/3/2022. She stated wanted to wait a few more months to see the listed. Return to the office on 2022 for a repeat ultrasound and to review management options again. That visit she reported that her cycles seem to be more regular although the most recent period on 2022 was heavy with clots. Her annual exam on 2022 reported that she was still having significant cramping. Reported bloating \"\". Had no see PCP or GI. Returns to the office today follow-up ultrasound. Ultrasound showed small ovarian cysts, thought most likely physiologic. Ultrasound today shows resolution of the cysts. Continues to have some irregular bleeding, heavy at times, with some brownish clotting. Eddi appears fairly normal.  There is a uterine fibroid. He is ultrasounds including SIS were suspicious for submucosal fibroid. TRANSVAGINAL ULTRASOUND PERFORMED  UTERUS IS ANTEVERTED, NORMAL IN SIZE AND HETEROGENEOUS IN ECHOGENICITY. A POSTERIOR RIGHT FIBROID IS SEEN AND MEASURED.  ENDOMETRIUM MEASURES 5-6MM IN THICKNESS. NO EVIDENCE OF MASSES OR ABNORMALITIES ARE SEEN. RIGHT OVARY APPEARS WITHIN NORMAL LIMITS. LEFT OVARY APPEARS WITHIN NORMAL LIMITS. NO FREE FLUID SEEN IN THE CDS. .    Past Medical History:   Diagnosis Date    Abnormal Pap smear of cervix 2015; 20    ASCUS ; HPV Positive -> rec colpo, pt declined. H/o Colpo in college. Not sure of details, thinks she was tx'd with cryo; 20 Neg pap +HPV -16 -18--> colpo neg path    H/O mammogram 2020    normal mammo    Headache     Hx of mammogram 2022    BI-RADS 1: Negative.  No mammographic evidence of malignancy    Routine Papanicolaou smear 2022    normal HPV neg    Screening for malignant neoplasm of the cervix 14; 18    Negative ; HPV Negative     Past Surgical History:   Procedure Laterality Date    HX  SECTION  2013    HX COLPOSCOPY  ~; 6/10/19 not sure of details. ?cryo done?; 6/2019 neg path     Social History     Occupational History    Not on file   Tobacco Use    Smoking status: Never    Smokeless tobacco: Never    Tobacco comments:     Does not use vapor and e-cigs   Substance and Sexual Activity    Alcohol use: Never     Alcohol/week: 0.0 standard drinks    Drug use: Never    Sexual activity: Yes     Partners: Male     Birth control/protection: None     Family History   Problem Relation Age of Onset    Osteoporosis Mother     Breast Cancer Paternal Grandmother        Allergies   Allergen Reactions    Sulfa (Sulfonamide Antibiotics) Hives     Prior to Admission medications    Medication Sig Start Date End Date Taking? Authorizing Provider   norethindrone acetate (AYGESTIN) 5 mg tablet Take 2 Tablets by mouth daily for 20 days. 11/10/22 11/30/22 Yes Olaf Taylor MD   acyclovir (ZOVIRAX) 400 mg tablet TAKE 1 TABLET BY MOUTH TWICE A DAY AS NEEDED COLD SORES FOR 5 DAYS AT A TIME 5/11/20  Yes Provider, Historical   ergocalciferol (ERGOCALCIFEROL) 1,250 mcg (50,000 unit) capsule TAKE 1 CAPSULE ONCE A WEEK ORALLY 28 4/15/20  Yes Provider, Historical   fexofenadine (ALLEGRA) 180 mg tablet TAKE 1 TABLET BY MOUTH EVERY DAY AS NEEDED FOR ALLERGIES 5/5/20  Yes Provider, Historical            Objective:  Visit Vitals  /86   Pulse 84   Wt 125 lb (56.7 kg)   LMP 11/04/2022   BMI 23.62 kg/m²       Physical Exam:     Constitutional  Appearance: well-nourished, well developed, alert, in no acute distress      Skin  General Inspection: no rash, no lesions identified    Neurologic/Psychiatric  Mental Status:  Orientation: grossly oriented to person, place and time  Mood and Affect: mood normal, affect appropriate    Assessment:  AUB   ? submucosal fibroid. Plan:   Reviewed US (previous and today). Discussed while some AUB could be d/t steroids she got in the spring, would not expect that to be cause ongoing bleeding.   Would not cause polyp or submucosal fibroid. Offered trial of Aygestin which she would like. Discussed medical management seems to be of limited efficacy for her. Hysteroscopy with resection of polyp/fibroid seems likely to be her best option. Could consider SIS, +/- EMB, but not sure how she would tolerate, and if still suspicious for submucosal fibroid, then would still recommend hysteroscopy as previously discussed and planned. She would like to check CBC, iron, TSH today  Will consider above. Orders Placed This Encounter    TSH 3RD GENERATION     Standing Status:   Future     Standing Expiration Date:   11/10/2023    CBC W/O DIFF     Standing Status:   Future     Standing Expiration Date:   5/10/2023    FERRITIN     Standing Status:   Future     Standing Expiration Date:   5/10/2023    IRON PROFILE     Standing Status:   Future     Standing Expiration Date:   5/10/2023    norethindrone acetate (AYGESTIN) 5 mg tablet     Sig: Take 2 Tablets by mouth daily for 20 days.      Dispense:  40 Tablet     Refill:  0

## 2022-11-10 ENCOUNTER — OFFICE VISIT (OUTPATIENT)
Dept: OBGYN CLINIC | Age: 47
End: 2022-11-10

## 2022-11-10 VITALS
SYSTOLIC BLOOD PRESSURE: 129 MMHG | DIASTOLIC BLOOD PRESSURE: 86 MMHG | WEIGHT: 125 LBS | HEART RATE: 84 BPM | BODY MASS INDEX: 23.62 KG/M2

## 2022-11-10 DIAGNOSIS — N93.9 ABNORMAL UTERINE BLEEDING (AUB): Primary | ICD-10-CM

## 2022-11-10 DIAGNOSIS — D21.9 FIBROIDS: ICD-10-CM

## 2022-11-10 PROCEDURE — 99214 OFFICE O/P EST MOD 30 MIN: CPT | Performed by: OBSTETRICS & GYNECOLOGY

## 2022-11-10 RX ORDER — NORETHINDRONE 5 MG/1
10 TABLET ORAL DAILY
Qty: 40 TABLET | Refills: 0 | Status: SHIPPED | OUTPATIENT
Start: 2022-11-10 | End: 2022-11-30

## 2023-01-17 ENCOUNTER — TELEPHONE (OUTPATIENT)
Dept: OBGYN CLINIC | Age: 48
End: 2023-01-17

## 2023-01-17 NOTE — TELEPHONE ENCOUNTER
56year old patient last seen in the office on 11/10/2022 for aub and ultrasound      Patient calling to say that she had a period that starting on 12/31/2022 for full 7 days and then stopped for a few days and then bleeding has restarted on Saturday 1/14/2023 and the bleeding is bright red      Patient is also complaining of pain on right side that she reports goes around to the back and is at the umbilicus and slightly above ?  GI related    Patient is wondering how to proceed    Patient states she is getting  soon and is wondering if ocp would help with her bleeding        Patient has had previous attempt at sis and was scheduled for surgery       Please advise    Patient currently scheduled for problem visit 20 minutes on Friday 1/20/2023 at 9:10am      Thank you

## 2023-01-17 NOTE — TELEPHONE ENCOUNTER
Patient advised of MD recommendations and appointment for Friday confirmed for 9:10am    Patient verbalized understanding.

## 2023-01-20 ENCOUNTER — OFFICE VISIT (OUTPATIENT)
Dept: OBGYN CLINIC | Age: 48
End: 2023-01-20
Payer: COMMERCIAL

## 2023-01-20 VITALS
DIASTOLIC BLOOD PRESSURE: 84 MMHG | HEART RATE: 90 BPM | WEIGHT: 120 LBS | SYSTOLIC BLOOD PRESSURE: 134 MMHG | BODY MASS INDEX: 22.67 KG/M2

## 2023-01-20 DIAGNOSIS — R93.5 ABNORMAL ENDOMETRIAL ULTRASOUND: ICD-10-CM

## 2023-01-20 DIAGNOSIS — N93.9 ABNORMAL UTERINE BLEEDING (AUB): Primary | ICD-10-CM

## 2023-01-20 DIAGNOSIS — D21.9 FIBROIDS: ICD-10-CM

## 2023-01-20 NOTE — PROGRESS NOTES
Per Nursing Note:  Patient's last menstrual period was 2022. Birth Control: none. Last Pap: normal obtained 2022     The patient is reporting having: Abnormal Bleeding   She reports the symptoms are is moderately worse. She reports that her periods last 7-15 days. OB/GYN Problem VIsit    HPI  Lefty Yost is a ,  56 y.o. female who presents for a problem visit. Several month h/o AUB. Presented in March with AUB. Saw Dr. Joel Arora, was prescribed Megace to help with her bleeding. Returned in April for an SIS. She was very uncomfortable with this procedure and there was some difficulty maintaining good distention which limited the quality of the study. The endometrium appeared irregular with a probable anterior wall polyp as well as a uterine fibroid with questionable submucosal involvement. Recommended hysteroscopy. She has been very reluctant to have surgery and canceled the procedure that was scheduled in . She was seen on 2022 with a repeat ultrasound. The endometrium measured 7 to 8 mm, difficult to visualize the borders. Small fibroids still seen. Small bilateral complex cysts both measuring less than 2 cm. Seen for annual exam on 2022. At that visit she reported that the timing of her cycles had gotten back on track, about every 3.5 weeks. She was still having bloating \"\". She returned on 11/10/2022 for follow-up ultrasound to reassess the previously seen bilateral ovarian cyst.  Ultrasound at that visit showed resolution of the cysts. The endometrium measured 5 to 6 mm with no evidence of masses or abnormalities seen. At that visit she reported that she continued to have some irregular bleeding, heavy at times, with some brownish clotting. Were ordered (TSH, CBC, ferritin, iron profile) but not drawn. Continues with AUB. Wishes to review options again.         Past Medical History:   Diagnosis Date    Abnormal Pap smear of cervix 2015; 20    ASCUS ; HPV Positive -> rec colpo, pt declined. H/o Colpo in college. Not sure of details, thinks she was tx'd with cryo; 20 Neg pap +HPV -16 -18--> colpo neg path    H/O mammogram 2020    normal mammo    Headache     Hx of mammogram 2022    BI-RADS 1: Negative. No mammographic evidence of malignancy    Routine Papanicolaou smear 2022    normal HPV neg    Screening for malignant neoplasm of the cervix 14; 18    Negative ; HPV Negative     Past Surgical History:   Procedure Laterality Date    HX  SECTION  2013    HX COLPOSCOPY  ~2000; 6/10/19    not sure of details. ?cryo done?; 2019 neg path     Social History     Occupational History    Not on file   Tobacco Use    Smoking status: Never    Smokeless tobacco: Never    Tobacco comments:     Does not use vapor and e-cigs   Substance and Sexual Activity    Alcohol use: Never     Alcohol/week: 0.0 standard drinks    Drug use: Never    Sexual activity: Yes     Partners: Male     Birth control/protection: None     Family History   Problem Relation Age of Onset    Osteoporosis Mother     Breast Cancer Paternal Grandmother        Allergies   Allergen Reactions    Sulfa (Sulfonamide Antibiotics) Hives     Prior to Admission medications    Medication Sig Start Date End Date Taking?  Authorizing Provider   acyclovir (ZOVIRAX) 400 mg tablet TAKE 1 TABLET BY MOUTH TWICE A DAY AS NEEDED COLD SORES FOR 5 DAYS AT A TIME 20  Yes Provider, Historical   ergocalciferol (ERGOCALCIFEROL) 1,250 mcg (50,000 unit) capsule TAKE 1 CAPSULE ONCE A WEEK ORALLY 28 4/15/20  Yes Provider, Historical   fexofenadine (ALLEGRA) 180 mg tablet TAKE 1 TABLET BY MOUTH EVERY DAY AS NEEDED FOR ALLERGIES 20  Yes Provider, Historical              Objective:  Visit Vitals  /84   Pulse 90   Wt 120 lb (54.4 kg)   LMP 2022   BMI 22.67 kg/m²       Physical Exam:   PHYSICAL EXAMINATION    Constitutional  Appearance: well-nourished, well developed, alert, in no acute distress    Chest  Respiratory Effort: breathing unlabored  Auscultation: normal breath sounds    Cardiovascular  Heart: Auscultation: regular rate and rhythm without murmur    Gastrointestinal  Abdominal Examination: abdomen non-tender to palpation, normal bowel sounds, no masses present  Liver and spleen: no hepatomegaly present, spleen not palpable  Hernias: no hernias identified    Skin  General Inspection: no rash, no lesions identified    Neurologic/Psychiatric  Mental Status:  Orientation: grossly oriented to person, place and time  Mood and Affect: mood normal, affect appropriate      ASSESSMENT:    ICD-10-CM ICD-9-CM    1. Abnormal uterine bleeding (AUB)  N93.9 626.9           PLAN:    Again reviewed options. Previous US has shown irregular endometrium as well as fibroid that may have submucosal component. Have perviously recommended hysteroscopy with resection of polyp/fibroid. Surgery had been scheduled then canceled by pt. She is now ready to proceed. Risks and benefits reviewed including: bleeding; infection; uterine perforation with injury to surrounding structures including bowel, bladder, ureters, muscles, vessels, nerves, possibly requiring additional surgery to repair or remove; persistence or recurrence of symptoms; need for additional surgery or treatment depending on results. Questions answered. Also discussed options for management of AUB postop. Is interested in Mirena IUD. Can do insertion at time of surgery. She does have menstrual HA. Counseled Mirena should help relate cycles, protect the endometrium, provide menstrual suppression. However it will not help with any systemic symptoms such as menstrual headache, PMS.

## 2023-01-20 NOTE — PROGRESS NOTES
Dino Cobb is a 56 y.o. female presents for a problem visit. Chief Complaint   Patient presents with    Well Woman     Patient's last menstrual period was 12/31/2022. Birth Control: none. Last Pap: normal obtained 9/2022    The patient is reporting having: Abnormal Bleeding   She reports the symptoms are is moderately worse. She reports that her periods last 7-15 days. .        1. Have you been to the ER, urgent care clinic, or hospitalized since your last visit? No    2. Have you seen or consulted any other health care providers outside of the [address] [place] [address] since your last visit?  No

## 2023-02-21 NOTE — PERIOP NOTES
[address] [address] [facility]   MAIN OR                                  [phone]   MAIN PRE OP                          [phone]                                                                                AMBULATORY PRE OP          [phone]  PRE-ADMISSION TESTING    [phone]   Surgery Date:  Thursday 2/23/23       Is surgery arrival time given by surgeon? YES  NO  If NO, [facility] staff will call you between 3 and 7pm the day before your surgery with your arrival time. (If your surgery is on a Monday, we will call you the Friday before.)    Call [phone] after 7pm Monday-Friday if you did not receive this call. INSTRUCTIONS BEFORE YOUR SURGERY   When You  Arrive Arrive at the [address] [facility] on the day of your surgery  Have your insurance card, photo ID, and any copayment (if needed)   Food   and   Drink NO food or drink after midnight the night before surgery    This means NO water, gum, mints, coffee, juice, etc.  No alcohol (beer, wine, liquor) 24 hours before and after surgery   Medications to   TAKE   Morning of Surgery MEDICATIONS TO TAKE THE MORNING OF SURGERY WITH A SIP OF WATER:      Medications  To  STOP      7 days before surgery Non-Steroidal anti-inflammatory Drugs (NSAID's): for example, Ibuprofen (Advil, Motrin), Naproxen (Aleve)  Aspirin, if taking for pain   Herbal supplements, vitamins, and fish oil  Other:  (Pain medications not listed above, including Tylenol may be taken)   Blood  Thinners If you take  Aspirin, Plavix, Coumadin, or any blood-thinning or anti-blood clot medicine, talk to the doctor who prescribed the medications for pre-operative instructions.    Bathing Clothing  Jewelry  Valuables     If you shower the morning of surgery, please do not apply anything to your skin (lotions, powders, deodorant, or makeup, especially mascara)  Follow [employer] body wash instructions provided to you during PAT appointment. Begin 3 days prior to surgery. Do not shave or trim anywhere 24 hours before surgery  Wear your hair loose or down; no pony-tails, buns, or metal hair clips  Wear loose, comfortable, clean clothes  Wear glasses instead of contacts  Leave money, valuables, and jewelry, including body piercings, at home  If you were given an [employer], bring it on day of surgery. Going Home - or Spending the Night SAME-DAY SURGERY: You must have a responsible adult drive you home and stay with you 24 hours after surgery  ADMITS: If your doctor is keeping you in the hospital after surgery, leave personal belongings/luggage in your car until you have a hospital room number. Hospital discharge time is 12 noon  Drivers must be here before 12 noon unless you are told differently   Special Instructions Please bring covid vaccine card day of surgery       Follow all instructions so your surgery wont be cancelled. Please, be on time. If a situation occurs and you are delayed the day of surgery, call [phone]     If your physical condition changes (like a fever, cold, flu, etc.) call your surgeon. Home medication(s) reviewed and verified via   PHONE     during PAT appointment. The patient was contacted by  PHONE      The patient verbalizes understanding of all instructions and     DOES NOT   need reinforcement.

## 2023-02-22 ENCOUNTER — ANESTHESIA EVENT (OUTPATIENT)
Dept: SURGERY | Age: 48
End: 2023-02-22
Payer: COMMERCIAL

## 2023-02-23 ENCOUNTER — ANESTHESIA (OUTPATIENT)
Dept: SURGERY | Age: 48
End: 2023-02-23
Payer: COMMERCIAL

## 2023-02-23 ENCOUNTER — HOSPITAL ENCOUNTER (OUTPATIENT)
Age: 48
Setting detail: OUTPATIENT SURGERY
Discharge: HOME | End: 2023-02-23
Attending: OBSTETRICS & GYNECOLOGY | Admitting: OBSTETRICS & GYNECOLOGY
Payer: COMMERCIAL

## 2023-02-23 VITALS
SYSTOLIC BLOOD PRESSURE: 135 MMHG | HEART RATE: 84 BPM | RESPIRATION RATE: 14 BRPM | HEIGHT: 62 IN | BODY MASS INDEX: 21.75 KG/M2 | TEMPERATURE: 98 F | WEIGHT: 118.17 LBS | OXYGEN SATURATION: 96 % | DIASTOLIC BLOOD PRESSURE: 77 MMHG

## 2023-02-23 DIAGNOSIS — N93.9 ABNORMAL UTERINE BLEEDING: ICD-10-CM

## 2023-02-23 LAB
ERYTHROCYTE [DISTWIDTH] IN BLOOD BY AUTOMATED COUNT: 12.7 % (ref 11.5–14.5)
HCG UR QL: NEGATIVE
HCT VFR BLD AUTO: 38.8 % (ref 35–47)
HGB BLD-MCNC: 13 G/DL (ref 11.5–16)
MCH RBC QN AUTO: 30.7 PG (ref 26–34)
MCHC RBC AUTO-ENTMCNC: 33.5 G/DL (ref 30–36.5)
MCV RBC AUTO: 91.5 FL (ref 80–99)
NRBC # BLD: 0 K/UL (ref 0–0.01)
NRBC BLD-RTO: 0 PER 100 WBC
PLATELET # BLD AUTO: 326 K/UL (ref 150–400)
PMV BLD AUTO: 10.6 FL (ref 8.9–12.9)
RBC # BLD AUTO: 4.24 M/UL (ref 3.8–5.2)
WBC # BLD AUTO: 7.7 K/UL (ref 3.6–11)

## 2023-02-23 PROCEDURE — 85027 COMPLETE CBC AUTOMATED: CPT

## 2023-02-23 PROCEDURE — 74011250636 HC RX REV CODE- 250/636: Performed by: OBSTETRICS & GYNECOLOGY

## 2023-02-23 PROCEDURE — 77030026236 HC DEV TISS RMVL MYOSUR HOLO -G1: Performed by: OBSTETRICS & GYNECOLOGY

## 2023-02-23 PROCEDURE — 76210000026 HC REC RM PH II 1 TO 1.5 HR: Performed by: OBSTETRICS & GYNECOLOGY

## 2023-02-23 PROCEDURE — 77030040361 HC SLV COMPR DVT MDII -B

## 2023-02-23 PROCEDURE — 77030036955 HC NDL INJ BLDR WALL RIF CYSTOSCP COLO -B: Performed by: OBSTETRICS & GYNECOLOGY

## 2023-02-23 PROCEDURE — 74011000250 HC RX REV CODE- 250: Performed by: NURSE ANESTHETIST, CERTIFIED REGISTERED

## 2023-02-23 PROCEDURE — 77030041423 HC SYST FLUID MNGMT FLUENT HOLO -D: Performed by: OBSTETRICS & GYNECOLOGY

## 2023-02-23 PROCEDURE — 2709999900 HC NON-CHARGEABLE SUPPLY: Performed by: OBSTETRICS & GYNECOLOGY

## 2023-02-23 PROCEDURE — 81025 URINE PREGNANCY TEST: CPT

## 2023-02-23 PROCEDURE — 74011250636 HC RX REV CODE- 250/636: Performed by: ANESTHESIOLOGY

## 2023-02-23 PROCEDURE — 77030040922 HC BLNKT HYPOTHRM STRY -A

## 2023-02-23 PROCEDURE — 76210000006 HC OR PH I REC 0.5 TO 1 HR: Performed by: OBSTETRICS & GYNECOLOGY

## 2023-02-23 PROCEDURE — 36415 COLL VENOUS BLD VENIPUNCTURE: CPT

## 2023-02-23 PROCEDURE — 74011250636 HC RX REV CODE- 250/636: Performed by: NURSE ANESTHETIST, CERTIFIED REGISTERED

## 2023-02-23 PROCEDURE — 76010000149 HC OR TIME 1 TO 1.5 HR: Performed by: OBSTETRICS & GYNECOLOGY

## 2023-02-23 PROCEDURE — 76060000033 HC ANESTHESIA 1 TO 1.5 HR: Performed by: OBSTETRICS & GYNECOLOGY

## 2023-02-23 PROCEDURE — 74011250637 HC RX REV CODE- 250/637: Performed by: OBSTETRICS & GYNECOLOGY

## 2023-02-23 PROCEDURE — 88305 TISSUE EXAM BY PATHOLOGIST: CPT

## 2023-02-23 PROCEDURE — 77030020143 HC AIRWY LARYN INTUB CGAS -A: Performed by: ANESTHESIOLOGY

## 2023-02-23 RX ORDER — ONDANSETRON 2 MG/ML
INJECTION INTRAMUSCULAR; INTRAVENOUS AS NEEDED
Status: DISCONTINUED | OUTPATIENT
Start: 2023-02-23 | End: 2023-02-23 | Stop reason: HOSPADM

## 2023-02-23 RX ORDER — SODIUM CHLORIDE, SODIUM LACTATE, POTASSIUM CHLORIDE, CALCIUM CHLORIDE 600; 310; 30; 20 MG/100ML; MG/100ML; MG/100ML; MG/100ML
125 INJECTION, SOLUTION INTRAVENOUS CONTINUOUS
Status: DISCONTINUED | OUTPATIENT
Start: 2023-02-23 | End: 2023-02-23 | Stop reason: HOSPADM

## 2023-02-23 RX ORDER — VASOPRESSIN 20 U/ML
INJECTION PARENTERAL AS NEEDED
Status: DISCONTINUED | OUTPATIENT
Start: 2023-02-23 | End: 2023-02-23 | Stop reason: HOSPADM

## 2023-02-23 RX ORDER — DEXAMETHASONE SODIUM PHOSPHATE 4 MG/ML
INJECTION, SOLUTION INTRA-ARTICULAR; INTRALESIONAL; INTRAMUSCULAR; INTRAVENOUS; SOFT TISSUE AS NEEDED
Status: DISCONTINUED | OUTPATIENT
Start: 2023-02-23 | End: 2023-02-23 | Stop reason: HOSPADM

## 2023-02-23 RX ORDER — DIPHENHYDRAMINE HYDROCHLORIDE 50 MG/ML
12.5 INJECTION, SOLUTION INTRAMUSCULAR; INTRAVENOUS AS NEEDED
Status: DISCONTINUED | OUTPATIENT
Start: 2023-02-23 | End: 2023-02-23 | Stop reason: HOSPADM

## 2023-02-23 RX ORDER — PROPOFOL 10 MG/ML
INJECTION, EMULSION INTRAVENOUS
Status: DISCONTINUED | OUTPATIENT
Start: 2023-02-23 | End: 2023-02-23 | Stop reason: HOSPADM

## 2023-02-23 RX ORDER — KETOROLAC TROMETHAMINE 30 MG/ML
INJECTION, SOLUTION INTRAMUSCULAR; INTRAVENOUS AS NEEDED
Status: DISCONTINUED | OUTPATIENT
Start: 2023-02-23 | End: 2023-02-23 | Stop reason: HOSPADM

## 2023-02-23 RX ORDER — ERGOCALCIFEROL 1.25 MG/1
50000 CAPSULE ORAL
COMMUNITY

## 2023-02-23 RX ORDER — LIDOCAINE HYDROCHLORIDE 20 MG/ML
INJECTION, SOLUTION EPIDURAL; INFILTRATION; INTRACAUDAL; PERINEURAL AS NEEDED
Status: DISCONTINUED | OUTPATIENT
Start: 2023-02-23 | End: 2023-02-23 | Stop reason: HOSPADM

## 2023-02-23 RX ORDER — HYDROMORPHONE HYDROCHLORIDE 1 MG/ML
.25-1 INJECTION, SOLUTION INTRAMUSCULAR; INTRAVENOUS; SUBCUTANEOUS
Status: DISCONTINUED | OUTPATIENT
Start: 2023-02-23 | End: 2023-02-23 | Stop reason: HOSPADM

## 2023-02-23 RX ORDER — MIDAZOLAM HYDROCHLORIDE 1 MG/ML
INJECTION, SOLUTION INTRAMUSCULAR; INTRAVENOUS AS NEEDED
Status: DISCONTINUED | OUTPATIENT
Start: 2023-02-23 | End: 2023-02-23 | Stop reason: HOSPADM

## 2023-02-23 RX ORDER — PROPOFOL 10 MG/ML
INJECTION, EMULSION INTRAVENOUS AS NEEDED
Status: DISCONTINUED | OUTPATIENT
Start: 2023-02-23 | End: 2023-02-23 | Stop reason: HOSPADM

## 2023-02-23 RX ORDER — IBUPROFEN 800 MG/1
800 TABLET ORAL ONCE
Status: COMPLETED | OUTPATIENT
Start: 2023-02-23 | End: 2023-02-23

## 2023-02-23 RX ORDER — SODIUM CHLORIDE, SODIUM LACTATE, POTASSIUM CHLORIDE, CALCIUM CHLORIDE 600; 310; 30; 20 MG/100ML; MG/100ML; MG/100ML; MG/100ML
INJECTION, SOLUTION INTRAVENOUS
Status: DISCONTINUED | OUTPATIENT
Start: 2023-02-23 | End: 2023-02-23 | Stop reason: HOSPADM

## 2023-02-23 RX ORDER — SODIUM CHLORIDE, SODIUM LACTATE, POTASSIUM CHLORIDE, CALCIUM CHLORIDE 600; 310; 30; 20 MG/100ML; MG/100ML; MG/100ML; MG/100ML
75 INJECTION, SOLUTION INTRAVENOUS CONTINUOUS
Status: DISCONTINUED | OUTPATIENT
Start: 2023-02-23 | End: 2023-02-23 | Stop reason: HOSPADM

## 2023-02-23 RX ORDER — HYDROMORPHONE HYDROCHLORIDE 2 MG/ML
INJECTION, SOLUTION INTRAMUSCULAR; INTRAVENOUS; SUBCUTANEOUS AS NEEDED
Status: DISCONTINUED | OUTPATIENT
Start: 2023-02-23 | End: 2023-02-23 | Stop reason: HOSPADM

## 2023-02-23 RX ORDER — IBUPROFEN 800 MG/1
800 TABLET ORAL
Qty: 15 TABLET | Refills: 0 | Status: SHIPPED | OUTPATIENT
Start: 2023-02-23

## 2023-02-23 RX ADMIN — HYDROMORPHONE HYDROCHLORIDE 0.5 MG: 1 INJECTION, SOLUTION INTRAMUSCULAR; INTRAVENOUS; SUBCUTANEOUS at 13:15

## 2023-02-23 RX ADMIN — HYDROMORPHONE HYDROCHLORIDE 0.25 MG: 2 INJECTION, SOLUTION INTRAMUSCULAR; INTRAVENOUS; SUBCUTANEOUS at 11:54

## 2023-02-23 RX ADMIN — HYDROMORPHONE HYDROCHLORIDE 0.25 MG: 2 INJECTION, SOLUTION INTRAMUSCULAR; INTRAVENOUS; SUBCUTANEOUS at 11:48

## 2023-02-23 RX ADMIN — DEXAMETHASONE SODIUM PHOSPHATE 4 MG: 4 INJECTION, SOLUTION INTRAMUSCULAR; INTRAVENOUS at 11:49

## 2023-02-23 RX ADMIN — SODIUM CHLORIDE, POTASSIUM CHLORIDE, SODIUM LACTATE AND CALCIUM CHLORIDE: 600; 310; 30; 20 INJECTION, SOLUTION INTRAVENOUS at 11:42

## 2023-02-23 RX ADMIN — LIDOCAINE HYDROCHLORIDE 100 MG: 20 INJECTION, SOLUTION EPIDURAL; INFILTRATION; INTRACAUDAL; PERINEURAL at 11:48

## 2023-02-23 RX ADMIN — SODIUM CHLORIDE, POTASSIUM CHLORIDE, SODIUM LACTATE AND CALCIUM CHLORIDE 75 ML/HR: 600; 310; 30; 20 INJECTION, SOLUTION INTRAVENOUS at 09:44

## 2023-02-23 RX ADMIN — IBUPROFEN 800 MG: 800 TABLET, FILM COATED ORAL at 14:35

## 2023-02-23 RX ADMIN — ONDANSETRON HYDROCHLORIDE 4 MG: 2 SOLUTION INTRAMUSCULAR; INTRAVENOUS at 12:25

## 2023-02-23 RX ADMIN — PROPOFOL 150 MCG/KG/MIN: 10 INJECTION, EMULSION INTRAVENOUS at 11:57

## 2023-02-23 RX ADMIN — PROPOFOL 150 MG: 10 INJECTION, EMULSION INTRAVENOUS at 11:57

## 2023-02-23 RX ADMIN — KETOROLAC TROMETHAMINE 30 MG: 30 INJECTION, SOLUTION INTRAMUSCULAR; INTRAVENOUS at 12:48

## 2023-02-23 RX ADMIN — MIDAZOLAM HYDROCHLORIDE 2 MG: 1 INJECTION, SOLUTION INTRAMUSCULAR; INTRAVENOUS at 11:48

## 2023-02-23 NOTE — BRIEF OP NOTE
Brief Postoperative Note    Patient: Edouard Justice  YOB: 1975  MRN: [id number]    Date of Procedure: 2/23/2023     Pre-Op Diagnosis: ABNORMAL UTERINE BLEEDING    Post-Op Diagnosis: Same as preoperative diagnosis. and intracavitary myoma       Procedure(s): [address], [place] [address] IUD INSERTION    Surgeon(s):  Dangelo Vivar MD    Surgical Assistant: None    Anesthesia: General     Estimated Blood Loss (mL): Minimal  Hysteroscopy fluid: NS, [id number]    Complications: None    Specimens:   ID Type Source Tests Collected by Time Destination   1 : ENDOMETRIAL CURRETTINGS Preservative Uterus  Dangelo Vivar MD 2/23/2023 1248 Pathology        Implants:   Implant Name Type Inv. Item Serial No.  Lot No. LRB No. Used Action    Mirena IUD   N/A  [id number] N/A 1 Implanted   Lot#: [id number]     Drains: * No LDAs found *    Findings: intracavitary fibroid, otherwise nl cavity with nl tubal ostia bilaterally.     Electronically Signed by Janet Araiza MD on 2/23/2023 at 12:53 PM

## 2023-02-23 NOTE — DISCHARGE INSTRUCTIONS
POSTOPERATIVE INSTRUCTIONS  FOR D&C, HYSTEROSCOPY      A D&C is a minor procedure. Your recovery from each one of these procedures should be relatively quick and uneventful. There are a few points that we ask you to remember:       1. Absolutely no intercourse, douching or tampon use until you are seen for your postop check. 2. You can expect to have some vaginal bleeding or bloody vaginal discharge for the    next 2 to 4 weeks. 3. You may take tub baths after two weeks and showers at any time. 4. You may resume normal everyday activities as you feel able and return to work    within 2-3 days after surgery. 5. Notify us if you experience:     a.  heavy vaginal bleeding or foul vaginal discharge    b.  temperature of 101° or greater    c.  severe pelvic or vaginal pain      6. Please call the office today at [phone] to schedule your checkup appointment for 4 weeks. Please schedule ultrasound with this appointment to follow-up IUD insertion. Above all, please notify us of a problem if it arises before we see you again. In an emergency, you may contact a doctor 24 hours a day at [phone]. [address] [address] [address] [facility] [address] [facility], [address] [place] [address]                    Phone [phone]         DISCHARGE SUMMARY from your Nurse      PATIENT INSTRUCTIONS    After general anesthesia or intravenous sedation, for 24 hours or while taking prescription Narcotics:  Limit your activities  Do not drive and operate hazardous machinery  Do not make important personal or business decisions  Do  not drink alcoholic beverages  If you have not urinated within 8 hours after discharge, please contact your surgeon on call.     Report the following to your surgeon:  Excessive pain, swelling, redness or odor of or around the surgical area  Temperature over 100.5  Nausea and vomiting lasting longer than 4 hours or if unable to take medications  Any signs of decreased circulation or nerve impairment to extremity: change in color, persistent  numbness, tingling, coldness or increase pain  Any questions      GOOD HELP TO FIGHT AN INFECTION  Here are a few tip to help reduce the chance of getting an infection after surgery:  Wash Your Hands  Good handwashing is the most important thing you and your caregiver can do. Wash before and after caring for any wounds. Dry your hand with a clean towel. Wash with soap and water for at least 20 seconds. A TIP: sing the \"Happy Birthday\" song through one time while washing to help with the timing. Use a hand  in between washings. Shower  When your surgeon says it is OK to take a shower, use a new bar of antibacterial soap (if that is what you use, and keep that bar of soap ONLY for your use), or antibacterial body wash. Use a clean wash cloth or sponge when you bathe. Dry off with a clean towel  after every bath - be careful around any wounds, skin staples, sutures or surgical glue over/on wounds. Do not enter swimming pools, hot tubs, lakes, rivers and/or ocean until wounds are healed and your doctor/surgeon says it is OK. Use Clean Sheets  Sleep on freshly laundered sheets after your surgery. Keep the surgery site covered with a clean, dry bandage (if instructed to do so). If the bandage becomes soiled, reapply a new, dry, clean bandage. Do not allow pets to sleep with you while your wound is healing. Lifestyle Modification and Controlling Your Blood Sugar  Smoking slows wound healing. Stop smoking and limit exposure to second-hand smoke. High blood sugar slows wound healing. Eat a well-balanced diet to provide proper nutrition while healing  Monitor your blood sugar (if you are a diabetic) and take your medications as you are suppose to so you can control you blood sugar after surgery.       COUGH AND DEEP BREATHE    Breathing deeply and coughing are very important exercises to do after surgery. Deep breathing and coughing open the little air tubes and air sacks in your lungs. You take deep breaths every day. You may not even notice - it is just something you do when you sigh or yawn. It is a natural exercise you do to keep these air passages open. After surgery, take deep breaths and cough, on purpose. DIRECTIONS:  Take 10 to 15 slow deep breaths every hour while awake. Breathe in deeply, and hold it for 2 seconds. Exhale slowly through puckered lips, like blowing up a balloon. After every 4th or 5th deep breath, hug your pillow to your chest or belly and give a hard, deep cough. Yes, it will probably hurt. But doing this exercise is a very important part of healing after surgery. Take your pain medicine to help you do this exercise without too much pain. Coughing and deep breathing help prevent bronchitis and pneumonia after surgery. If you had chest or belly surgery, use a pillow as a \"hug debra\" and hold it tightly to your chest or belly when you cough. ANKLE PUMPS    Ankle pumps increase the circulation of oxygenated blood to your lower extremities and decrease your risk for circulation problems such as blood clots. They also stretch the muscles, tendons and ligaments in your foot and ankle, and prevent joint contracture in the ankle and foot, especially after surgeries on the legs. It is important to do ankle pump exercises regularly after surgery because immobility increases your risk for developing a blood clot. Your doctor may also have you take an Aspirin for the next few days as well. If your doctor did not ask you to take an Aspirin, consult with him before starting Aspirin therapy on your own. The exercise is quite simple.      Slowly point your foot forward, feeling the muscles on the top of your lower leg stretch, and hold this position for 5 seconds. Next, pull your foot back toward you as far as possible, stretching the calf muscles, and hold that position for 5 seconds. Repeat with the other foot. Perform 10 repetitions every hour while awake for both ankles if possible (down and then up with the foot once is one repetition). You should feel gentle stretching of the muscles in your lower leg when doing this exercise. If you feel pain, or your range of motion is limited, don't push too hard. Only go the limit your joint and muscles will let you go. If you have increasing pain, progressively worsening leg warmth or swelling, STOP the exercise and call your doctor. MEDICATION AND   SIDE EFFECT GUIDE    The [address] [place] [address] MEDICATION AND SIDE EFFECT GUIDE was provided to the PATIENT AND CARE PROVIDER. Information provided includes instruction about drug purpose and common side effects for the following medications:   ***        These are general instructions for a healthy lifestyle:    *   Please give a list of your current medications to your Primary Care Provider. *   Please update this list whenever your medications are discontinued, doses are changed, or new medications (including over-the-counter products) are added. *   Please carry medication information at all times in case of emergency situations. About Smoking  No smoking / No tobacco products  Avoid exposure to second hand smoke     Surgeon General's Warning:  Quitting smoking now greatly reduces serious risk to your health. Obesity, smoking, and sedentary lifestyle greatly increases your risk for illness and disease. A healthy diet, regular physical exercise & weight monitoring are important for maintaining a healthy lifestyle.       Congestive Heart Failure  You may be retaining fluid if you have a history of heart failure or if you experience any of the following symptoms:  Weight gain of 3 pounds or more overnight or 5 pounds in a week, increased swelling in your hands or feet or shortness of breath while lying flat in bed. Please call your doctor as soon as you notice any of these symptoms; do not wait until your next office visit. Recognize signs and symptoms of STROKE:  F -  Face looks uneven  A -  Arms unable to move or move evenly  S -  Speech slurred or non-existent  T -  Time-call 911 as soon as signs and symptoms begin-DO NOT go          back to bed or wait to see if you get better-TIME IS BRAIN. Warning Signs of HEART ATTACK   Call 911 if you have these symptoms:    Chest discomfort. Most heart attacks involve discomfort in the center of the chest that lasts more than a few minutes, or that goes away and comes back. It can feel like uncomfortable pressure, squeezing, fullness, or pain. Discomfort in other areas of the upper body. Symptoms can include pain or discomfort in one or both arms, the back, neck, jaw, or stomach. Shortness of breath with or without chest discomfort. Other signs may include breaking out in a cold sweat, nausea, or lightheadedness. Don't wait more than five minutes to call 911 - MINUTES MATTER! Fast action can save your life. Calling 911 is almost always the fastest way to get lifesaving treatment. Emergency Medical Services staff can begin treatment when they arrive -- up to an hour sooner than if someone gets to the hospital by car. Learning About Coronavirus [phone])  Coronavirus [phone]): Overview  What is coronavirus (COVID-19)? The coronavirus disease (COVID-19) is caused by a virus. It is an illness that was first found in [place], [place], in December 2019. It has since spread worldwide. The virus can cause fever, cough, and trouble breathing. In severe cases, it can cause pneumonia and make it hard to breathe without help. It can cause death. Coronaviruses are a large group of viruses. They cause the common cold.  They also cause more serious illnesses like [place] respiratory syndrome (MERS) and severe acute respiratory syndrome (SARS). COVID-19 is caused by a novel coronavirus. That means it's a new type that has not been seen in people before. This virus spreads person-to-person through droplets from coughing and sneezing. It can also spread when you are close to someone who is infected. And it can spread when you touch something that has the virus on it, such as a doorknob or a tabletop. What can you do to protect yourself from coronavirus (COVID-19)? The best way to protect yourself from getting sick is to: Avoid areas where there is an outbreak. Avoid contact with people who may be infected. Wash your hands often with soap or alcohol-based hand sanitizers. Avoid crowds and try to stay at least 6 feet away from other people. Wash your hands often, especially after you cough or sneeze. Use soap and water, and scrub for at least 20 seconds. If soap and water aren't available, use an alcohol-based hand . Avoid touching your mouth, nose, and eyes. What can you do to avoid spreading the virus to others? To help avoid spreading the virus to others:  Cover your mouth with a tissue when you cough or sneeze. Then throw the tissue in the trash. Use a disinfectant to clean things that you touch often. Stay home if you are sick or have been exposed to the virus. Don't go to school, work, or public areas. And don't use public transportation. If you are sick:  Leave your home only if you need to get medical care. But call the doctor's office first so they know you're coming. And wear a face mask, if you have one. If you have a face mask, wear it whenever you're around other people. It can help stop the spread of the virus when you cough or sneeze. Clean and disinfect your home every day. Use household  and disinfectant wipes or sprays. Take special care to clean things that you grab with your hands.  These include doorknobs, remote controls, phones, and handles on your refrigerator and microwave. And don't forget countertops, tabletops, bathrooms, and computer keyboards. When to call for help  Call 911 anytime you think you may need emergency care. For example, call if:  You have severe trouble breathing. (You can't talk at all.)  You have constant chest pain or pressure. You are severely dizzy or lightheaded. You are confused or can't think clearly. Your face and lips have a blue color. You pass out (lose consciousness) or are very hard to wake up. Call your doctor now if you develop symptoms such as:  Shortness of breath. Fever. Cough. If you need to get care, call ahead to the doctor's office for instructions before you go. Make sure you wear a face mask, if you have one, to prevent exposing other people to the virus. Where can you get the latest information? The following health organizations are tracking and studying this virus. Their websites contain the most up-to-date information. Lyla Arambula also learn what to do if you think you may have been exposed to the virus. U.S. Centers for Disease Control and Prevention (CDC): The CDC provides updated news about the disease and travel advice. The website also tells you how to prevent the spread of infection. www.cdc.gov  World Health Organization [facility]): WHO offers information about the virus outbreaks. WHO also has travel advice. www.who.int  Current as of: April 1, 2020               Content Version: 12.4  © 2404-2272 [employer]orporated. Care instructions adapted under license by your healthcare professional. If you have questions about a medical condition or this instruction, always ask your healthcare professional. [address] any warranty or liability for your use of this information. The discharge information has been reviewed with the {PATIENT PARENT GUARDIAN:04980}. Any questions and concerns from the {PATIENT PARENT GUARDIAN:91139} have been addressed.   The {PATIENT PARENT GUARDIAN:72164} verbalized understanding. CONTENTS FOUND IN YOUR DISCHARGE ENVELOPE:  [x]     Surgeon and Hospital Discharge Instructions  [x]     [facility] Care Provider Card  []     Medication & Side Effect Guide            (your newly prescribed medications have been marked/highlighted showing the most common side effects from   the classes of drugs on your prescriptions)  []     Medication Prescription(s) x *** ( [] These have been sent electronically to your pharmacy by your surgeon,   - OR -       your surgeon has already provided these to you during a previous/pre-op office visit)  []     [address]  []     Physical Therapy Prescription  []     Follow-up Appointment Cards  []     Surgery-related Pictures/Media  []     Pain block and/or block with On-Q Catheter from Anesthesia Service (information included in your instructions above)  []     Medical device information sheets/pamphlets from their    []     School/work excuse note. []     /parent work excuse note. The following personal items collected during your admission are returned to you:   Dental Appliance: Dental Appliances: None  Vision: Visual Aid: Glasses, At home (Glasses for reading left at home)  Hearing Aid:    Jewelry: Jewelry: None  Clothing: Clothing: Other (comment) (Street clothes, sent to PACU locker)  Other Valuables: Other Valuables: Cell Phone, Purse (Cell phone to PACU, purse given to iraida prior to coming into preop)  Valuables sent to safe:             Fax [phone]

## 2023-02-23 NOTE — ANESTHESIA POSTPROCEDURE EVALUATION
Procedure(s): HYSTEROSCOPY DILATION AND CURETTAGE, MYOMECTOMY WITH MYOSURE, MIRENA IUD INSERTION. general, total IV anesthesia    Anesthesia Post Evaluation        Patient location during evaluation: PACU  Level of consciousness: awake  Pain management: adequate  Airway patency: patent  Anesthetic complications: no  Cardiovascular status: acceptable  Respiratory status: acceptable  Hydration status: acceptable  Post anesthesia nausea and vomiting:  none      INITIAL Post-op Vital signs:   Vitals Value Taken Time   /80 02/23/23 1354   Temp 36.5 °C (97.7 °F) 02/23/23 1307   Pulse 79 02/23/23 1334   Resp 13 02/23/23 1334   SpO2 98 % 02/23/23 1357   Vitals shown include unvalidated device data.

## 2023-02-23 NOTE — PROGRESS NOTES
02/23/23 0950   Skin Integumentary   Skin Integumentary (WDL) WDL   Abdominal    Abdominal (WDL) WDL   Last Bowel Movement Date 02/23/23   Bowel Sounds Active    VTE Prophylaxis (Shift Required Documentation)   VTE Risk Assessment Surgery within month   Mechanical VTE Orders Yes   Sequential/Intermittent Compression Device Bilateral   Peripheral Vascular   Peripheral Vascular (WDL) WDL   LLE Peripheral Vascular    Capillary Refill Less than/equal to 3 seconds   Color Appropriate for race   Temperature Warm   Sensation Present   Pedal Pulse Palpable;Present;+2   RLE Peripheral Vascular    Capillary Refill Less than/equal to 3 seconds   Color Appropriate for race   Temperature Warm   Pedal Pulse Palpable;Present;+2   Musculoskeletal   LLE Other (comment)  (No abnormalities noted, within normal limits)   RLE Other (comment)  (No abnormalities noted, within normal limits)   Genitourinary   Genitourinary (WDL) WDL   Musculoskeletal   Musculoskeletal (WDL) WDL   Assessment accurate and correct.  Agree with charted assessment and findings

## 2023-02-23 NOTE — ANESTHESIA PREPROCEDURE EVALUATION
Relevant Problems   No relevant active problems       Anesthetic History   No history of anesthetic complications            Review of Systems / Medical History  Patient summary reviewed, nursing notes reviewed and pertinent labs reviewed    Pulmonary  Within defined limits                 Neuro/Psych   Within defined limits           Cardiovascular  Within defined limits                     GI/Hepatic/Renal  Within defined limits              Endo/Other  Within defined limits           Other Findings              Physical Exam    Airway  Mallampati: II  TM Distance: 4 - 6 cm  Neck ROM: normal range of motion   Mouth opening: Normal     Cardiovascular    Rhythm: regular  Rate: normal         Dental    Dentition: Lower dentition intact and Upper dentition intact     Pulmonary  Breath sounds clear to auscultation               Abdominal         Other Findings            Anesthetic Plan    ASA: 1  Anesthesia type: general and total IV anesthesia          Induction: Intravenous  Anesthetic plan and risks discussed with: Patient

## 2023-02-23 NOTE — PROGRESS NOTES
02/23/23 0948   Respiratory   Respiratory (WDL) WDL   Breath Sounds Bilateral Lower;Middle; Upper;Clear   Cardiac   Cardiac (WDL) WDL   B/P Outside of Defined Limits No   Cardiac Regularity Regular   Cardiac Rhythm Sinus Rhythm   Heart Sounds S1 S2   Neuro   Neuro (WDL) WDL   Neurologic State Alert; Appropriate for age   Orientation Level Appropriate for age;Oriented X4   Cognition Appropriate decision making; Appropriate for age attention/concentration; Appropriate safety awareness; Follows commands   Speech Clear   LLE Motor Response Purposeful   RLE Motor Response Purposeful   Assessment accurate and correct, agree with assessment preformed.

## 2023-02-24 ENCOUNTER — TELEPHONE (OUTPATIENT)
Dept: OBGYN CLINIC | Age: 48
End: 2023-02-24

## 2023-02-24 NOTE — TELEPHONE ENCOUNTER
Pt had  Hysteroscopy, D and C, myomectomy with MyoSure, and insertion of Mirena IUD. Done yesterday 2/23/23. Pt called and wanted to know what was found during the procedure. Pt insisted  to speak with the provider directly today.

## 2023-02-24 NOTE — TELEPHONE ENCOUNTER
Brenda msg sent to pt:    Hello Ana,    I am tied up in the OR, so may not be able to call. You had a fibroid filling the endometrial cavity, did not see a separate polyp. Everything else looked normal.  I was able to remove the fibroid entirely, so that should make a HUGE difference in your bleeding. IUD was placed at the end of the procedure without difficulty. Let me know if you have any other questions. Dr. Adalberto Chan.

## 2023-02-24 NOTE — OP NOTES
Jin Aleman [facility] 80  OPERATIVE REPORT    Name:  Qamar Gramajo  MR#:  [id number]  :  1975  ACCOUNT #:  [de-identified]  DATE OF SERVICE:  2023    PREOPERATIVE DIAGNOSES:  1. Abnormal uterine bleeding. 2.  Suspected endometrial polyp. 3.  Uterine fibroid. POSTOPERATIVE DIAGNOSES:  1. Abnormal uterine bleeding. 2.  Intracavitary myoma. PROCEDURES PERFORMED:  Hysteroscopy, D and C, myomectomy with MyoSure, and insertion of Mirena IUD. SURGEON:  Mercy Lentz MD    ASSISTANT:  None. ANESTHESIA:  General via LMA. COMPLICATIONS:  None. SPECIMENS REMOVED:  Endometrial curettings with fibroids. IMPLANTS:  Mirena IUD. Lot number [id number]. ESTIMATED BLOOD LOSS:  Minimal.    HYSTEROSCOPY FLUID:  Normal saline with a deficit of 325 mL. FINDINGS:  Intracavitary fibroid arising from the posterior wall. Normal tubal ostia bilaterally. Otherwise, normal-appearing endometrium with no evidence of endometrial polyp. DRAINS:  None. DISPOSITION:  To recovery room in stable condition. OPERATIVE INDICATIONS:  This is a 69-year-old patient with abnormal uterine bleeding. An SIS was attempted in the office; however, visualization was limited due to difficulty maintaining distention as well as the patient's discomfort. The endometrial cavity did appear somewhat irregular. She was noted to have a fibroid, but it could not be determined if this was submucosal or not. She continued with AUB and therefore brought to the OR for further diagnosis and treatment. She has also opted to have a Mirena IUD placed. PROCEDURE:  Risks, benefits and alternatives were discussed with the patient and informed consent obtained. The patient was identified in the holding area and again in the operating room. General anesthesia via LMA was established.   She was placed in modified lithotomy position in [address] [place] [address] and prepped and draped in the usual sterile fashion including in-and-out catheterization of the bladder. A surgical time-out was performed. Exam under anesthesia demonstrated a small mobile anteverted uterus with a questionable small fibroid. The cervix was visualized with a speculum and the anterior lip grasped with a single-tooth tenaculum. The cervical os did appear somewhat stenotic. It was sequentially dilated with Hegar dilators to a #6 dilator. This was accomplished easily. The hysteroscope was introduced into the endocervical canal and easily advanced into the endometrial cavity with the above findings noted. The fibroid was then injected with dilute vasopressin (20 units in 50 mL of normal saline). A total of approximately 8 mL was used. This resulted in excellent blanching of the fibroid. The MyoSure REACH blade was then introduced through the operative port and used to resect the fibroid. The hysteroscope was removed and a few minutes allowed to elapse to allow the uterus to decompress. The hysteroscope was reintroduced and the endometrial cavity re-distended. There was noted to be some additional protrusion of the fibroid, which was then easily excised. No additional findings were noted. The hysteroscope was removed. Mirena IUD was then inserted. The endometrial cavity sounded to 6 cm using the Mirena applicator. The IUD was then inserted without difficulty. The strings were trimmed to 4 cm. The single-tooth tenaculum was removed and the site noted to be hemostatic. All counts were correct. The patient was awakened in the operating room and went to the recovery room in stable condition.       Damien Sicard, MD [id number]  D:  02/23/2023 16:55  T:  02/24/2023 0:33  JOB #:  [id number]

## 2023-03-23 ENCOUNTER — OFFICE VISIT (OUTPATIENT)
Dept: OBGYN CLINIC | Age: 48
End: 2023-03-23

## 2023-03-23 VITALS
SYSTOLIC BLOOD PRESSURE: 113 MMHG | WEIGHT: 118 LBS | BODY MASS INDEX: 21.58 KG/M2 | DIASTOLIC BLOOD PRESSURE: 81 MMHG | HEART RATE: 93 BPM

## 2023-03-23 DIAGNOSIS — Z09 POSTOP CHECK: Primary | ICD-10-CM

## 2023-03-23 DIAGNOSIS — Z30.431 SURVEILLANCE OF (INTRAUTERINE) CONTRACEPTIVE DEVICE: ICD-10-CM

## 2023-03-23 NOTE — PROGRESS NOTES
Postop Hysteroscopy/D & C     Ana Watson is a 56 y.o. female returns for a routine post-operative follow-up visit after undergoing the following: Hysteroscopy/D & C/myomectomy/Mirena insertion, which was done 23. Her pathology results revealed     Endometrium, curetting:        Disordered proliferative endometrium   Fragments of leiomyomata . Has had some irregular bleeding. Had some \"chunky\" bleeding after IC, ~3wks postop. Thinks she had her first cycle since surgery, just finished bleeding. Lasted about 8 days. Has not tried checking strings. Has been SA, no issues with strings. US today show IUD in proper position;  TRANSVAGINAL ULTRASOUND PERFORMED  UTERUS IS ANTEVERTED, NORMAL IN SIZE AND ECHOGENICITY. ENDOMETRIUM MEASURES 5MM IN THICKNESS. NO EVIDENCE OF MASSES OR ABNORMALITIES ARE SEEN. IUD IS SEEN IN THE PROPER POSITION WITHIN THE ENDOMETRIAL CAVITY IN THE UTERINE FUNDUS. RIGHT OVARY APPEARS WITHIN NORMAL LIMITS. THERE APPEARS TO BE A FOLLICULAR CYST. LEFT OVARY APPEARS WITHIN NORMAL LIMITS. NO FREE FLUID SEEN IN THE CDS. Past Medical History:   Diagnosis Date    Abnormal Pap smear of cervix 2015; 20    ASCUS ; HPV Positive -> rec colpo, pt declined. H/o Colpo in college. Not sure of details, thinks she was tx'd with cryo; 19 Neg pap +HPV -16 -18--> colpo neg path    Headache     Screening for malignant neoplasm of the cervix 14; 18    Negative ; HPV Negative     Past Surgical History:   Procedure Laterality Date    HX  SECTION  2013    HX COLPOSCOPY  ~; 6/10/19    not sure of details.   ?cryo done?; 2019 neg path    HX WISDOM TEETH EXTRACTION       Social History     Occupational History    Not on file   Tobacco Use    Smoking status: Never    Smokeless tobacco: Never    Tobacco comments:     Does not use vapor and e-cigs   Vaping Use    Vaping Use: Never used   Substance and Sexual Activity    Alcohol use: Never     Alcohol/week: 0.0 standard drinks    Drug use: Never    Sexual activity: Yes     Partners: Male     Birth control/protection: None     Family History   Problem Relation Age of Onset    Osteoporosis Mother     Breast Cancer Paternal Grandmother         Allergies   Allergen Reactions    Sulfa (Sulfonamide Antibiotics) Hives     Prior to Admission medications    Medication Sig Start Date End Date Taking? Authorizing Provider   ergocalciferol (ERGOCALCIFEROL) 1,250 mcg (50,000 unit) capsule Take 50,000 Units by mouth. Yes Provider, Historical   ibuprofen (MOTRIN) 800 mg tablet Take 1 Tablet by mouth every eight (8) hours as needed for Pain.  2/23/23  Yes Blanca Norton MD   acyclovir (ZOVIRAX) 400 mg tablet TAKE 1 TABLET BY MOUTH TWICE A DAY AS NEEDED COLD SORES FOR 5 DAYS AT A TIME 5/11/20  Yes Provider, Historical   fexofenadine (ALLEGRA) 180 mg tablet TAKE 1 TABLET BY MOUTH EVERY DAY AS NEEDED FOR ALLERGIES 5/5/20  Yes Provider, Historical                      Review of Systems - History obtained from the patient  Constitutional: negative for weight loss, fever, night sweats  Breast: negative for breast lumps, nipple discharge, galactorrhea  GI: negative for change in bowel habits, abdominal pain, black or bloody stools  : negative for frequency, dysuria, hematuria  MSK: negative for back pain, joint pain, muscle pain  Skin: negative for itching, rash, hives  Neuro: negative for dizziness, headache, confusion, weakness  Psych: negative for anxiety, depression, change in mood  Heme/lymph: negative for bleeding, bruising, pallor     PHYSICAL EXAMINATION    Visit Vitals  /81   Pulse 93   Wt 118 lb (53.5 kg)   BMI 21.58 kg/m²       Gastrointestinal  Abdominal Examination: abdomen non-tender to palpation,  no masses present  Liver and spleen: no hepatomegaly present, spleen not palpable  Hernias: no hernias identified    Genitourinary  External Genitalia: normal appearance for age, no discharge present, no tenderness present, no inflammatory lesions present, no masses present, no atrophy present  Vagina: normal vaginal vault without central or paravaginal defects, no discharge present, no inflammatory lesions present, no masses present  Bladder: non-tender to palpation  Urethra: appears normal  Cervix: normal, IUD strings wnl, ~3-4cm  Uterus: normal size, shape and consistency, NT  Adnexa: no adnexal tenderness present, no adnexal masses present  Perineum: perineum within normal limits, no evidence of trauma, no rashes or skin lesions present  Anus: anus within normal limits, no hemorrhoids present  Inguinal Lymph Nodes: no lymphadenopathy present      Skin  General Inspection: no rash, no lesions identified    Neurologic/Psychiatric  Mental Status:  Orientation: grossly oriented to person, place and time  Mood and Affect: mood normal, affect appropriate    Assessment:  Normal postop evaluation for Hysteroscopy/D & C/myomectomy/Mirena insertion  Disordered prolif endometrium      Plan:  Reviewed surgical finding, images, pathology report  Advised may have some irregular bleeding over the next few months. Call if heavy or having significant pain  Instructed on how to check IUD strings.   AE due in Sept, sooner prn

## 2023-03-23 NOTE — PROGRESS NOTES
Soham Lizarraga is a 56 y.o. female presents for a problem visit. Chief Complaint   Patient presents with    Checkup IUD    Post OP Follow Up     No LMP recorded. (Menstrual status: IUD). Birth Control: IUD. The patient is here today for post op follow up from 2/23/23 Hysteroscopy, D and C, myomectomy with MyoSure, and insertion of Mirena IUD. TRANSVAGINAL ULTRASOUND PERFORMED  UTERUS IS ANTEVERTED, NORMAL IN SIZE AND ECHOGENICITY. ENDOMETRIUM MEASURES 5MM IN THICKNESS. NO EVIDENCE OF MASSES OR ABNORMALITIES ARE SEEN. IUD IS SEEN IN THE PROPER POSITION WITHIN THE ENDOMETRIAL CAVITY IN THE UTERINE FUNDUS. RIGHT OVARY APPEARS WITHIN NORMAL LIMITS. THERE APPEARS TO BE A FOLLICULAR CYST. LEFT OVARY APPEARS WITHIN NORMAL LIMITS. NO FREE FLUID SEEN IN THE CDS.

## 2023-05-15 ENCOUNTER — OFFICE VISIT (OUTPATIENT)
Age: 48
End: 2023-05-15
Payer: MEDICAID

## 2023-05-15 VITALS
DIASTOLIC BLOOD PRESSURE: 85 MMHG | HEART RATE: 91 BPM | SYSTOLIC BLOOD PRESSURE: 123 MMHG | HEIGHT: 62 IN | BODY MASS INDEX: 21.71 KG/M2 | WEIGHT: 118 LBS

## 2023-05-15 DIAGNOSIS — R10.31 RLQ ABDOMINAL PAIN: Primary | ICD-10-CM

## 2023-05-15 DIAGNOSIS — Z30.431 SURVEILLANCE OF (INTRAUTERINE) CONTRACEPTIVE DEVICE: ICD-10-CM

## 2023-05-15 DIAGNOSIS — N83.201 CYST OF RIGHT OVARY: ICD-10-CM

## 2023-05-15 PROCEDURE — 99213 OFFICE O/P EST LOW 20 MIN: CPT | Performed by: OBSTETRICS & GYNECOLOGY

## 2023-05-15 NOTE — PROGRESS NOTES
OB/GYN Problem Visit        HPI  Liz Courtney is a No obstetric history on file. ,  56 y.o. female who presents for a problem visit. No LMP recorded. (Menstrual status: IUD). Getting  23    Mirena IUD placed 23 in OR at time of hysteroscopy/D&C/myomectomy. US (3/23/23) showed IUD in proper position. Reports intermittent pelvic pain since her surgery. Was gradually improving, then plateaued (but did not resolve). Started having more RLQ pain over the last month, increased over the last week. Some bowel changes, but does have cyclic changes with her cycle. More pimples. Vaginal dryness. US today shows IUD in proper position. ROV with 2.8cm cyst, most c/w physiologic CL [not present on US ]  LOV with follicle, 1cm  No free fluid      Past Medical History:   Diagnosis Date    Abnormal Pap smear of cervix 2015; 20    ASCUS ; HPV Positive -> rec colpo, pt declined. H/o Colpo in college. Not sure of details, thinks she was tx'd with cryo; 19 Neg pap +HPV -16 -18--> colpo neg path    Headache     Screening for malignant neoplasm of the cervix 14; 18    Negative ; HPV Negative     Past Surgical History:   Procedure Laterality Date     SECTION  2013    COLPOSCOPY      ; 6/10/19    WISDOM TOOTH EXTRACTION       Social History     Occupational History    Not on file   Tobacco Use    Smoking status: Never    Smokeless tobacco: Never   Vaping Use    Vaping Use: Never used   Substance and Sexual Activity    Alcohol use: Never     Alcohol/week: 0.0 standard drinks    Drug use: Never    Sexual activity: Yes     Partners: Male     Birth control/protection: I.U.D.      Family History   Problem Relation Age of Onset    Breast Cancer Paternal Grandmother     Osteoporosis Mother        Allergies   Allergen Reactions    Dust Mite Extract Anaphylaxis    Diclofenac Nausea And Vomiting and Other (See Comments)     Other reaction(s): GI

## 2023-05-20 RX ORDER — ACYCLOVIR 400 MG/1
TABLET ORAL
COMMUNITY
Start: 2020-05-11

## 2023-05-20 RX ORDER — ERGOCALCIFEROL 1.25 MG/1
50000 CAPSULE ORAL
COMMUNITY

## 2023-05-20 RX ORDER — FEXOFENADINE HCL 180 MG/1
TABLET ORAL
COMMUNITY
Start: 2020-05-05

## 2023-05-20 RX ORDER — IBUPROFEN 800 MG/1
800 TABLET ORAL EVERY 8 HOURS PRN
COMMUNITY
Start: 2023-02-23

## 2023-05-30 ENCOUNTER — TELEPHONE (OUTPATIENT)
Age: 48
End: 2023-05-30

## 2023-05-30 NOTE — TELEPHONE ENCOUNTER
You can offer her this [facility] 6/1 @ [address] [facility] or [address] [place] [address]

## 2023-05-30 NOTE — TELEPHONE ENCOUNTER
Two patient identifiers used    56year old patient seen in the office on 5/15/2023    Patient calling to get appointment to have her iud taken out    Patient states she was offered to have it taken out at appointment and declined    Patient reports she can not stand the pain which ranges from 7-8 on the pain scale of 1-10 and vaginal bleeding that she is changing her pad 8-10 times per day and reports the pad is not saturated     Patient was placed on the first available appointment for 6/13/2023 for removal and patient would like to be seen sooner , that she can not tolerate the iud     Please advise    Thank you

## 2023-05-30 NOTE — TELEPHONE ENCOUNTER
Patient advised of MD recommendations and was placed on the schedule for iud removal on 6/1/2023 at 8:50am    Patient verbalized understanding.      6/13/2023 appointment  cancelled

## 2023-06-01 ENCOUNTER — PROCEDURE VISIT (OUTPATIENT)
Age: 48
End: 2023-06-01
Payer: COMMERCIAL

## 2023-06-01 VITALS — WEIGHT: 121 LBS | SYSTOLIC BLOOD PRESSURE: 127 MMHG | DIASTOLIC BLOOD PRESSURE: 84 MMHG | BODY MASS INDEX: 22.13 KG/M2

## 2023-06-01 DIAGNOSIS — Z30.432 ENCOUNTER FOR IUD REMOVAL: Primary | ICD-10-CM

## 2023-06-01 PROCEDURE — 58301 REMOVE INTRAUTERINE DEVICE: CPT | Performed by: OBSTETRICS & GYNECOLOGY

## 2023-06-01 NOTE — PROGRESS NOTES
Yue Valderrama is a 56 y.o. female presents for a problem visit. Chief Complaint   Patient presents with    IUD Removal     No LMP recorded. (Menstrual status: IUD). Birth Control: IUD. Last Pap: normal obtained 1 year(s) ago on 9/13/22. The patient is reporting having: generalized pelvic pain that radiates to RLQ, pain with intercourse and vaginal bleeding. She states when not having intercourse she would have the pain but its not as bad/strong as if she were having intercourse. She also c/o having vaginal bleeding that varies from heavy to light in flow. The bleeding has been constant since having IUD inserted. The patient also states she believes it has caused some emotional issues, feels sad all the time, upset  She has not been happy since having iud inserted, feels foggy. She declines to go on any other contraceptive at this time. 1. Have you been to the ER, urgent care clinic, or hospitalized since your last visit? No    2. Have you seen or consulted any other health care providers outside of the [address] [place] [address] since your last visit?  No    Examination chaperoned by Yuri Schmidt MA.
Smokeless tobacco: Never   Vaping Use    Vaping Use: Never used   Substance and Sexual Activity    Alcohol use: Never     Alcohol/week: 0.0 standard drinks    Drug use: Never    Sexual activity: Yes     Partners: Male     Birth control/protection: I.U.D. Family History   Problem Relation Age of Onset    Breast Cancer Paternal Grandmother     Osteoporosis Mother        Allergies   Allergen Reactions    Dust Mite Extract Anaphylaxis    Diclofenac Nausea And Vomiting and Other (See Comments)     Other reaction(s): GI intolerance  dizziness  dizziness      Meloxicam Other (See Comments)     Other reaction(s): GI intolerance, GI intolerance  Dizziness  Dizziness      Sulfa Antibiotics Hives     Prior to Admission medications    Medication Sig Start Date End Date Taking?  Authorizing Provider   acyclovir (ZOVIRAX) 400 MG tablet TAKE 1 TABLET BY MOUTH TWICE A DAY AS NEEDED COLD SORES FOR 5 DAYS AT A TIME 5/11/20  Yes Ar Automatic Reconciliation   ergocalciferol (ERGOCALCIFEROL) 1.25 MG (45757 UT) capsule Take 1 capsule by mouth   Yes Ar Automatic Reconciliation   fexofenadine (ALLEGRA) 180 MG tablet TAKE 1 TABLET BY MOUTH EVERY DAY AS NEEDED FOR ALLERGIES 5/5/20  Yes Ar Automatic Reconciliation   ibuprofen (ADVIL;MOTRIN) 800 MG tablet Take 1 tablet by mouth every 8 hours as needed 2/23/23  Yes Ar Automatic Reconciliation            Objective:  /84   Wt 121 lb (54.9 kg)   BMI 22.13 kg/m²     Physical Exam:   PHYSICAL EXAMINATION    Constitutional  Appearance: well-nourished, well developed, alert, in no acute distress      Genitourinary  External Genitalia: normal appearance for age, no discharge present, no tenderness present, no inflammatory lesions present, no masses present, no atrophy present  Vagina: normal vaginal vault without central or paravaginal defects, no discharge present, no inflammatory lesions present, no masses present  Urethra: appears normal  Cervix: normal; strings ~3cm  Perineum: perineum

## 2023-06-26 ENCOUNTER — OFFICE VISIT (OUTPATIENT)
Age: 48
End: 2023-06-26

## 2023-06-26 VITALS — WEIGHT: 120 LBS | SYSTOLIC BLOOD PRESSURE: 123 MMHG | BODY MASS INDEX: 21.95 KG/M2 | DIASTOLIC BLOOD PRESSURE: 75 MMHG

## 2023-06-26 DIAGNOSIS — N83.201 CYST OF RIGHT OVARY: Primary | ICD-10-CM

## 2023-09-12 NOTE — PROGRESS NOTES
Matthew Cordero is a 53 y.o. female returns for an annual exam     Chief Complaint   Patient presents with    Annual Exam       Patient's last menstrual period was 08/05/2023. Her periods are usually moderate in flow and usually regular with a 26-32 day interval with 3-7 day duration. She did take a preg test yesterday and it was positive  She does not have dysmenorrhea. Problems: problems - vaginal dryness and decreased libido  Birth Control: none. Last Pap: normal obtained 9/2022  She does have a history of abnormal pap smears    ASCUS ; HPV Positive -> rec colpo, pt declined. H/o Colpo in college. Not sure of details, thinks she was tx'd with cryo; 5/13/19 Neg pap +HPV -16 -18--> colpo neg path    Last Mammogram:  did not have today due to +preg test yesterday. Evelyne Ferraro

## 2023-09-14 ENCOUNTER — OFFICE VISIT (OUTPATIENT)
Age: 48
End: 2023-09-14
Payer: COMMERCIAL

## 2023-09-14 VITALS
DIASTOLIC BLOOD PRESSURE: 71 MMHG | WEIGHT: 122 LBS | BODY MASS INDEX: 22.31 KG/M2 | SYSTOLIC BLOOD PRESSURE: 108 MMHG | HEART RATE: 101 BPM

## 2023-09-14 DIAGNOSIS — Z32.01 PREGNANCY CONFIRMED BY POSITIVE URINE TEST: ICD-10-CM

## 2023-09-14 DIAGNOSIS — Z11.3 SCREEN FOR STD (SEXUALLY TRANSMITTED DISEASE): ICD-10-CM

## 2023-09-14 DIAGNOSIS — Z01.419 ENCOUNTER FOR WELL WOMAN EXAM WITH ROUTINE GYNECOLOGICAL EXAM: Primary | ICD-10-CM

## 2023-09-14 LAB
HCG, PREGNANCY, URINE, POC: POSITIVE
VALID INTERNAL CONTROL, POC: YES

## 2023-09-14 PROCEDURE — 81025 URINE PREGNANCY TEST: CPT | Performed by: OBSTETRICS & GYNECOLOGY

## 2023-09-14 PROCEDURE — 99396 PREV VISIT EST AGE 40-64: CPT | Performed by: OBSTETRICS & GYNECOLOGY

## 2023-09-14 RX ORDER — ONDANSETRON 4 MG/1
4 TABLET, ORALLY DISINTEGRATING ORAL EVERY 8 HOURS PRN
Qty: 30 TABLET | Refills: 3 | Status: SHIPPED | OUTPATIENT
Start: 2023-09-14

## 2023-09-14 RX ORDER — PNV NO.95/FERROUS FUM/FOLIC AC 28MG-0.8MG
1 TABLET ORAL DAILY
Qty: 30 TABLET | Refills: 12 | Status: SHIPPED | OUTPATIENT
Start: 2023-09-14

## 2023-09-14 RX ORDER — PROMETHAZINE HYDROCHLORIDE 25 MG/1
25 TABLET ORAL EVERY 6 HOURS PRN
Qty: 30 TABLET | Refills: 3 | Status: SHIPPED | OUTPATIENT
Start: 2023-09-14

## 2023-09-14 RX ORDER — DOXYLAMINE SUCCINATE AND PYRIDOXINE HYDROCHLORIDE, DELAYED RELEASE TABLETS 10 MG/10 MG 10; 10 MG/1; MG/1
2 TABLET, DELAYED RELEASE ORAL NIGHTLY
Qty: 120 TABLET | Refills: 3 | Status: SHIPPED | OUTPATIENT
Start: 2023-09-14

## 2023-09-14 NOTE — PROGRESS NOTES
Per Nursing Note:  Royer Lopez is a 53 y.o. female returns for an annual exam          Chief Complaint   Patient presents with    Annual Exam         Patient's last menstrual period was 08/05/2023. Her periods are usually moderate in flow and usually regular with a 26-32 day interval with 3-7 day duration. She did take a preg test yesterday and it was positive  She does not have dysmenorrhea. Problems: problems - vaginal dryness and decreased libido  Birth Control: none. Last Pap: normal obtained 9/2022  She does have a history of abnormal pap smears     ASCUS ; HPV Positive -> rec colpo, pt declined. H/o Colpo in college. Not sure of details, thinks she was tx'd with cryo; 5/13/19 Neg pap +HPV -16 -18--> colpo neg path     Last Mammogram:  did not have today due to +preg test yesterday. .        Annual exam      Royer Lopez is a No obstetric history on file. ,  53 y.o. female   Patient's last menstrual period was 08/05/2023. AE=9/13/22    She presents for her annual checkup. HS (2/23/23) with resection of submucosal fibroid and insertion of Mirena. Mirena IUD placed 2/23/2023 in OR -> removed 6/1/2023 (was having AUB as well as a variety of other symptoms had resolved at her last visit following removal). Ultrasounds have shown physiologic CL. Has had pain with these in the past.    Reports +UPT yesterday. Some nausea and breast tenderness that started about 4d ago. Denies any bleeding. Did have some mild cramping around the time of her expected cycle, consistent with implantation. Had hyperemesis gravidarum with her previous pregnancy. Contraception:    The current method of family planning is none/withdrawal.      Sexual history:    She  reports being sexually active and has had partner(s) who are male. She reports using the following method of birth control/protection: None. Pap Smear:    H/o colpo in college, not sure of results. Thinks may have been tx'd with   cryo.   Pap/HPV

## 2023-09-15 LAB — HCG INTACT+B SERPL-ACNC: NORMAL MIU/ML

## 2023-09-17 LAB
C TRACH RRNA SPEC QL NAA+PROBE: NEGATIVE
HCG INTACT+B SERPL-ACNC: NORMAL MIU/ML
N GONORRHOEA RRNA SPEC QL NAA+PROBE: NEGATIVE
T VAGINALIS RRNA SPEC QL NAA+PROBE: NEGATIVE

## 2023-09-28 ENCOUNTER — NURSE ONLY (OUTPATIENT)
Age: 48
End: 2023-09-28

## 2023-09-28 DIAGNOSIS — Z32.01 POSITIVE URINE PREGNANCY TEST: Primary | ICD-10-CM

## 2023-09-29 ENCOUNTER — TELEPHONE (OUTPATIENT)
Age: 48
End: 2023-09-29

## 2023-09-29 LAB — HCG SERPL-ACNC: ABNORMAL MIU/ML (ref 0–6)

## 2023-09-29 NOTE — TELEPHONE ENCOUNTER
Two patient identifiers used     53year old patient reports LMP is 8/5/2023 7w6d pregnant    Patient reports having hcg betas done and the most recent was yesterday and patient had read the communication sent to her by my chart from the MD    Patient Communication     Append Comments   Seen Back to Top    Unfortunately, these levels are dropping from your previous levels. This usually means that the pregnancy is not developing normally. Please keep your appointment with ultrasound as scheduled for 10/5. If you would like, you can repeat a quant on Monday. Please schedule a lab appointment if you want to have this drawn. Written by Rony uSn MD on 9/29/2023  8:03 AM EDT  Seen by patient Reese Bahena on 9/29/2023 10:23 AM      Patient calling to discuss recent lab work results has reviewed by Md      Patient was advised to keep upcoming appointment for repeat beta and 10/5/2023 appointment for ultrasound    Patient reported cramping yesterday at at 6-7 on the pain scale . Patient denies cramping today and denies vaginal bleeding. Patient was provided with pain and bleeding precautions    Patient will monitor her symptoms and keep lab and office appointments scheduled for next week    Patient verbalized understanding.

## 2023-09-29 NOTE — TELEPHONE ENCOUNTER
Two patient identifiers used    Patient calling back to say that she has been on google with her sister and is wondering if she is having an ectopic pregnancy    This nurse spoke to Dr Ignacia Benítez , work in MD and he reviewed recent hcg and patient was advised mostly not ectopic    Patient again reports she is not having any cramping and no vaginal bleeding    Patient was advised that is she was having ectopic pregnancy should would most likely be having extreme debilitating pain and vaginal spotting    Patient was again provided with pain and bleeding precautions        Patient will monitor her symptoms and contact on call if needed    Patient was placed on the schedule for problem visit on Monday 10/2/2023    This nurse will contact patient in the am on 10/2/2023 after speaking to MD and checking for possible ultrasound spot

## 2023-10-02 ENCOUNTER — OFFICE VISIT (OUTPATIENT)
Age: 48
End: 2023-10-02
Payer: COMMERCIAL

## 2023-10-02 VITALS
BODY MASS INDEX: 22.5 KG/M2 | SYSTOLIC BLOOD PRESSURE: 117 MMHG | WEIGHT: 123 LBS | HEART RATE: 88 BPM | DIASTOLIC BLOOD PRESSURE: 77 MMHG

## 2023-10-02 DIAGNOSIS — O02.1 MISSED AB: Primary | ICD-10-CM

## 2023-10-02 PROCEDURE — 99214 OFFICE O/P EST MOD 30 MIN: CPT | Performed by: OBSTETRICS & GYNECOLOGY

## 2023-10-02 RX ORDER — MISOPROSTOL 200 UG/1
TABLET ORAL
Qty: 10 TABLET | Refills: 0 | Status: SHIPPED | OUTPATIENT
Start: 2023-10-02

## 2023-10-02 RX ORDER — HYDROCODONE BITARTRATE AND ACETAMINOPHEN 5; 325 MG/1; MG/1
1 TABLET ORAL EVERY 4 HOURS PRN
Qty: 5 TABLET | Refills: 0 | Status: SHIPPED | OUTPATIENT
Start: 2023-10-02 | End: 2023-10-05

## 2023-10-03 LAB
ABO + RH BLD: NORMAL
BLOOD BANK CMNT PATIENT-IMP: NORMAL
HCG SERPL-ACNC: ABNORMAL MIU/ML (ref 0–6)

## 2023-10-03 NOTE — PROGRESS NOTES
Carline Rodriguez is a 53 y.o. female presents for a problem visit. Chief Complaint   Patient presents with    Vaginal Bleeding     Patient's last menstrual period was 08/05/2023. Birth Control: none. Last Pap: normal obtained 9/2022. The patient is reporting having:  some moderate brown clotty bleeding and cramping  for 2 days. She reports the symptoms are has slightly worsened.
 Para Term  AB Living   2 1 1     1   SAB IAB Ectopic Molar Multiple Live Births             1      # Outcome Date GA Lbr Dennis/2nd Weight Sex Delivery Anes PTL Lv   2 Current            1 Term 02/10/13 40w0d 23:00 5 lb 8 oz (2.495 kg) M  Spinal N LAUREN      Birth Comments: Cord wrapped around baby's nexk - emerg. c/s       Social History     Occupational History    Not on file   Tobacco Use    Smoking status: Never    Smokeless tobacco: Never   Vaping Use    Vaping Use: Never used   Substance and Sexual Activity    Alcohol use: Never     Alcohol/week: 0.0 standard drinks of alcohol    Drug use: Never    Sexual activity: Yes     Partners: Male     Birth control/protection: None     Family History   Problem Relation Age of Onset    Breast Cancer Paternal Grandmother     Osteoporosis Mother        Allergies   Allergen Reactions    Dust Mite Extract Anaphylaxis    Diclofenac Nausea And Vomiting and Other (See Comments)     Other reaction(s): GI intolerance  dizziness  dizziness      Meloxicam Other (See Comments)     Other reaction(s): GI intolerance, GI intolerance  Dizziness  Dizziness      Sulfa Antibiotics Hives     Prior to Admission medications    Medication Sig Start Date End Date Taking? Authorizing Provider   miSOPROStol (CYTOTEC) 200 MCG tablet Take 5 tabs by mouth as one time dose. Can repeat in 24hours if no results. 10/2/23  Yes Barrera Riley MD   HYDROcodone-acetaminophen (NORCO) 5-325 MG per tablet Take 1 tablet by mouth every 4 hours as needed for Pain for up to 3 days. Max Daily Amount: 6 tablets 10/2/23 10/5/23 Yes Barrera Riley MD   Prenatal Vit-Fe Fumarate-FA (PRENATAL VITAMIN) 27-0.8 MG TABS Take 1 tablet by mouth daily 23   Barrera Riley MD   doxylamine-pyridoxine (DICLEGIS) 10-10 MG TBEC Take 2 tablets by mouth at bedtime Take 2 tablets by mouth nightly. May add 1 tab in the morning and 1 tab in the afternoon. 4 tabs max daily.  #120 tabs for 30 days 23   Barrera Riley

## 2023-10-24 ENCOUNTER — OFFICE VISIT (OUTPATIENT)
Age: 48
End: 2023-10-24

## 2023-10-24 VITALS
DIASTOLIC BLOOD PRESSURE: 81 MMHG | HEART RATE: 97 BPM | WEIGHT: 125 LBS | BODY MASS INDEX: 22.86 KG/M2 | SYSTOLIC BLOOD PRESSURE: 124 MMHG

## 2023-10-24 DIAGNOSIS — O03.9 SAB (SPONTANEOUS ABORTION): Primary | ICD-10-CM

## 2023-10-24 NOTE — PROGRESS NOTES
Roopa Pitt is a 53 y.o. female presents for a problem visit. Chief Complaint   Patient presents with    Follow-up     Patient's last menstrual period was 08/05/2023. Birth Control: none. Last Pap: normal obtained 9/2022. The patient is reporting having:  bleeding, nausea and headaches  for 3 weeks. She states she has been bleeding since before taking the Cytotec and passing tissue. She reports the symptoms are is unchanged. This is what her ultrasound showed today. TV ULTRASOUND PERFORMED. UTERUS IS ANTEVERTED, NORMAL IN SIZE AND ECHOGENICITY. THERE APPEARS TO BE AN ANTERIOR RIGHT INTRAMURAL FIBROID THAT MEASURES 11 X 10 X 12MM. THE ENDOMETRIUM APPEARS HETEROGENEOUS, IRREGULAR AND MEASURES 7-8MM IN THICKNESS. BLOODFLOW IS PRESENT. THERE APPEARS TO BE AN ECHOGENIC STRUCTURE THAT MEASURES 11 X 6MM. RIGHT OVARY APPEARS WITHIN NORMAL LIMITS. LEFT OVARY APPEARS WITHIN NORMAL LIMITS. A FOLLICULAR CYST IS SEEN AND MEASURES 7 X 8MM. NO FREE FLUID SEEN IN THE CDS.
Future     Number of Occurrences:   1     Standing Expiration Date:   10/24/2024    Ferritin     Standing Status:   Future     Number of Occurrences:   1     Standing Expiration Date:   10/24/2024    Iron and TIBC    IBUPROFEN PO     Sig: Take by mouth

## 2023-10-25 LAB
ERYTHROCYTE [DISTWIDTH] IN BLOOD BY AUTOMATED COUNT: 12.7 % (ref 11.5–14.5)
FERRITIN SERPL-MCNC: 32 NG/ML (ref 8–252)
HCG SERPL-ACNC: 81 MIU/ML (ref 0–6)
HCT VFR BLD AUTO: 41.6 % (ref 35–47)
HGB BLD-MCNC: 12.6 G/DL (ref 11.5–16)
IRON SATN MFR SERPL: 11 % (ref 20–50)
IRON SERPL-MCNC: 39 UG/DL (ref 35–150)
MCH RBC QN AUTO: 30.2 PG (ref 26–34)
MCHC RBC AUTO-ENTMCNC: 30.3 G/DL (ref 30–36.5)
MCV RBC AUTO: 99.8 FL (ref 80–99)
NRBC # BLD: 0 K/UL (ref 0–0.01)
NRBC BLD-RTO: 0 PER 100 WBC
PLATELET # BLD AUTO: 342 K/UL (ref 150–400)
PMV BLD AUTO: 10.5 FL (ref 8.9–12.9)
RBC # BLD AUTO: 4.17 M/UL (ref 3.8–5.2)
TIBC SERPL-MCNC: 367 UG/DL (ref 250–450)
WBC # BLD AUTO: 9.1 K/UL (ref 3.6–11)

## 2025-05-08 NOTE — PROGRESS NOTES
Josef Toney is a 49 y.o. female returns for an annual exam     No chief complaint on file.      No LMP recorded.  Her periods are light in flow and usually regular with a 26-32 day interval with 3-7 day duration.  She does not have dysmenorrhea.  Problems: has had some hot flashes and had vaginal irritation, requests std testing.   Birth Control: none.  Last Pap: normal obtained 3 year(s) ago.  She does not have a history of LIGIA 2, 3 or cervical cancer.   Last Mammogram: had her mammogram today in our office.    Last colonoscopy: never obtained           Examination chaperoned by HUY WISE RN.

## 2025-05-13 ENCOUNTER — OFFICE VISIT (OUTPATIENT)
Age: 50
End: 2025-05-13
Payer: COMMERCIAL

## 2025-05-13 ENCOUNTER — RESULTS FOLLOW-UP (OUTPATIENT)
Age: 50
End: 2025-05-13

## 2025-05-13 VITALS
SYSTOLIC BLOOD PRESSURE: 118 MMHG | HEART RATE: 98 BPM | DIASTOLIC BLOOD PRESSURE: 76 MMHG | HEIGHT: 62 IN | BODY MASS INDEX: 22.45 KG/M2 | WEIGHT: 122 LBS

## 2025-05-13 DIAGNOSIS — Z11.3 SCREEN FOR STD (SEXUALLY TRANSMITTED DISEASE): ICD-10-CM

## 2025-05-13 DIAGNOSIS — Z01.419 ENCOUNTER FOR WELL WOMAN EXAM WITH ROUTINE GYNECOLOGICAL EXAM: Primary | ICD-10-CM

## 2025-05-13 DIAGNOSIS — N89.8 VAGINAL DISCHARGE: ICD-10-CM

## 2025-05-13 DIAGNOSIS — Z12.4 SCREENING FOR CERVICAL CANCER: ICD-10-CM

## 2025-05-13 PROCEDURE — 99396 PREV VISIT EST AGE 40-64: CPT | Performed by: OBSTETRICS & GYNECOLOGY

## 2025-05-13 PROCEDURE — 99459 PELVIC EXAMINATION: CPT | Performed by: OBSTETRICS & GYNECOLOGY

## 2025-05-13 SDOH — ECONOMIC STABILITY: FOOD INSECURITY: WITHIN THE PAST 12 MONTHS, THE FOOD YOU BOUGHT JUST DIDN'T LAST AND YOU DIDN'T HAVE MONEY TO GET MORE.: NEVER TRUE

## 2025-05-13 SDOH — ECONOMIC STABILITY: FOOD INSECURITY: WITHIN THE PAST 12 MONTHS, YOU WORRIED THAT YOUR FOOD WOULD RUN OUT BEFORE YOU GOT MONEY TO BUY MORE.: NEVER TRUE

## 2025-05-13 ASSESSMENT — PATIENT HEALTH QUESTIONNAIRE - PHQ9
1. LITTLE INTEREST OR PLEASURE IN DOING THINGS: NOT AT ALL
SUM OF ALL RESPONSES TO PHQ QUESTIONS 1-9: 0
2. FEELING DOWN, DEPRESSED OR HOPELESS: NOT AT ALL
SUM OF ALL RESPONSES TO PHQ QUESTIONS 1-9: 0

## 2025-05-13 NOTE — PROGRESS NOTES
Annual exam  Josef Toney is a ,  49 y.o. female   Patient's last menstrual period was 2025.    She presents for her annual checkup.     History of Present Illness  The patient presents for an annual exam.    She reports experiencing hot flashes, particularly at night, which disrupt her sleep. She also mentions regular menstrual cycles and a history of significant bleeding during her engagement period, which necessitated surgical intervention. Post-surgery, she experienced a weight gain of 15 pounds and perceives herself as obese. She has not undergone a colonoscopy. She reports no bladder issues or unusual discharge. She has completed her mammogram screening.    She expresses concern about her current weight, stating that she has significant bloating after eating.    Sexual history:    She  reports being sexually active and has had partner(s) who are male. She reports using the following method of birth control/protection: None.    Per Rooming Note:  No LMP recorded.  Her periods are light in flow and usually regular with a 26-32 day interval with 3-7 day duration.  She does not have dysmenorrhea.  Problems: has had some hot flashes and had vaginal irritation, requests std testing.   Birth Control: none.  Last Pap: normal obtained 3 year(s) ago.  She does not have a history of LIGIA 2, 3 or cervical cancer.   Last Mammogram: had her mammogram today in our office.    Last colonoscopy: never obtained     Past Medical History:   Diagnosis Date    Abnormal Pap smear of cervix 2015; 20    ASCUS ; HPV Positive -> rec colpo, pt declined.  H/o Colpo in college. Not sure of details, thinks she was tx'd with cryo; 19 Neg pap +HPV -16 -18--> colpo neg path    Headache     IUD contraception     placed 23 in OR -> removed 23 (AUB, RLQ pain)    Routine Papanicolaou smear 2022    Negative ; HPV Negative     Past Surgical History:   Procedure Laterality Date     SECTION  2013

## 2025-05-14 LAB
HBV SURFACE AG SER QL: 0.15 INDEX
HBV SURFACE AG SER QL: NEGATIVE
HCV AB SER IA-ACNC: 0.03 INDEX
HCV AB SERPL QL IA: NONREACTIVE
HIV 1+2 AB+HIV1 P24 AG SERPL QL IA: NONREACTIVE
HIV 1/2 RESULT COMMENT: NORMAL
RPR SER QL: NONREACTIVE

## 2025-05-15 LAB
A VAGINAE DNA VAG QL NAA+PROBE: NORMAL SCORE
BVAB2 DNA VAG QL NAA+PROBE: NORMAL SCORE
C ALBICANS DNA VAG QL NAA+PROBE: NEGATIVE
C GLABRATA DNA VAG QL NAA+PROBE: NEGATIVE
MEGA1 DNA VAG QL NAA+PROBE: NORMAL SCORE

## 2025-05-16 ENCOUNTER — RESULTS FOLLOW-UP (OUTPATIENT)
Age: 50
End: 2025-05-16

## 2025-05-19 LAB

## 2025-08-21 ENCOUNTER — TELEPHONE (OUTPATIENT)
Age: 50
End: 2025-08-21

## (undated) DEVICE — SET SEALS HYSTEROSCOPE DISP -- MYOSURE  EA=10

## (undated) DEVICE — PERI GYN-SFMC: Brand: MEDLINE INDUSTRIES, INC.

## (undated) DEVICE — (D)DEVICE TISS RETRV 4MMX25.25 -- DISC BY MFR USE ITEM 335978

## (undated) DEVICE — FLUID MGMT SYS FLUENT KIT 6/PK

## (undated) DEVICE — BONEE® NEEDLE FOR BLADDER INJECTION CH FR 05, 22G, 35 CM, BOX OF 1: Brand: PORGES COLOPLAST

## (undated) DEVICE — SOLUTION IRRIG 3000ML 0.9% SOD CHL USP UROMATIC PLAS CONT

## (undated) DEVICE — GLOVE ORTHO 7   MSG9470